# Patient Record
Sex: MALE | Race: WHITE | NOT HISPANIC OR LATINO | Employment: FULL TIME | URBAN - METROPOLITAN AREA
[De-identification: names, ages, dates, MRNs, and addresses within clinical notes are randomized per-mention and may not be internally consistent; named-entity substitution may affect disease eponyms.]

---

## 2023-01-20 ENCOUNTER — OFFICE VISIT (OUTPATIENT)
Dept: FAMILY MEDICINE CLINIC | Facility: CLINIC | Age: 38
End: 2023-01-20
Payer: COMMERCIAL

## 2023-01-20 VITALS
BODY MASS INDEX: 41.07 KG/M2 | OXYGEN SATURATION: 98 % | WEIGHT: 271 LBS | HEART RATE: 79 BPM | SYSTOLIC BLOOD PRESSURE: 140 MMHG | TEMPERATURE: 97.3 F | DIASTOLIC BLOOD PRESSURE: 74 MMHG | HEIGHT: 68 IN | RESPIRATION RATE: 18 BRPM

## 2023-01-20 DIAGNOSIS — L74.519 PRIMARY FOCAL HYPERHIDROSIS: ICD-10-CM

## 2023-01-20 DIAGNOSIS — I10 BENIGN ESSENTIAL HYPERTENSION: Primary | ICD-10-CM

## 2023-01-20 DIAGNOSIS — R73.09 INCREASED GLUCOSE LEVEL: ICD-10-CM

## 2023-01-20 DIAGNOSIS — Z00.00 HEALTHCARE MAINTENANCE: ICD-10-CM

## 2023-01-20 DIAGNOSIS — F41.9 ANXIETY: ICD-10-CM

## 2023-01-20 DIAGNOSIS — K21.9 GASTROESOPHAGEAL REFLUX DISEASE, UNSPECIFIED WHETHER ESOPHAGITIS PRESENT: ICD-10-CM

## 2023-01-20 DIAGNOSIS — E78.5 HYPERLIPIDEMIA, UNSPECIFIED HYPERLIPIDEMIA TYPE: ICD-10-CM

## 2023-01-20 PROBLEM — H91.92 HEARING LOSS OF LEFT EAR: Status: ACTIVE | Noted: 2017-01-09

## 2023-01-20 PROBLEM — H91.92 HEARING LOSS OF LEFT EAR: Status: RESOLVED | Noted: 2017-01-09 | Resolved: 2023-01-20

## 2023-01-20 PROBLEM — R20.2 PARESTHESIA OF LEFT UPPER LIMB: Status: ACTIVE | Noted: 2018-10-11

## 2023-01-20 PROBLEM — G44.209 TENSION-TYPE HEADACHE: Status: ACTIVE | Noted: 2018-08-28

## 2023-01-20 PROCEDURE — 99385 PREV VISIT NEW AGE 18-39: CPT

## 2023-01-20 PROCEDURE — 99214 OFFICE O/P EST MOD 30 MIN: CPT

## 2023-01-20 RX ORDER — GLYCOPYRROLATE 2 MG/1
2 TABLET ORAL 2 TIMES DAILY
COMMUNITY
End: 2023-01-20 | Stop reason: SDUPTHER

## 2023-01-20 RX ORDER — ATORVASTATIN CALCIUM 40 MG/1
40 TABLET, FILM COATED ORAL DAILY
Qty: 90 TABLET | Refills: 0 | Status: SHIPPED | OUTPATIENT
Start: 2023-01-20 | End: 2023-03-08

## 2023-01-20 RX ORDER — ATORVASTATIN CALCIUM 40 MG/1
40 TABLET, FILM COATED ORAL DAILY
COMMUNITY
End: 2023-01-20 | Stop reason: SDUPTHER

## 2023-01-20 RX ORDER — AMLODIPINE BESYLATE 10 MG/1
10 TABLET ORAL DAILY
Qty: 90 TABLET | Refills: 0 | Status: SHIPPED | OUTPATIENT
Start: 2023-01-20 | End: 2023-03-08

## 2023-01-20 RX ORDER — LANSOPRAZOLE 30 MG/1
30 CAPSULE, DELAYED RELEASE ORAL DAILY
Qty: 90 CAPSULE | Refills: 1 | Status: SHIPPED | OUTPATIENT
Start: 2023-01-20

## 2023-01-20 RX ORDER — METOPROLOL SUCCINATE 50 MG/1
50 TABLET, EXTENDED RELEASE ORAL DAILY
Qty: 90 TABLET | Refills: 0 | Status: SHIPPED | OUTPATIENT
Start: 2023-01-20 | End: 2023-03-08

## 2023-01-20 RX ORDER — GLYCOPYRROLATE 2 MG/1
2 TABLET ORAL 2 TIMES DAILY
Qty: 90 TABLET | Refills: 1 | Status: SHIPPED | OUTPATIENT
Start: 2023-01-20 | End: 2023-03-08

## 2023-01-20 RX ORDER — AMLODIPINE BESYLATE 10 MG/1
10 TABLET ORAL DAILY
COMMUNITY
End: 2023-01-20 | Stop reason: SDUPTHER

## 2023-01-20 RX ORDER — BUSPIRONE HYDROCHLORIDE 5 MG/1
5 TABLET ORAL 3 TIMES DAILY PRN
Qty: 60 TABLET | Refills: 0 | Status: SHIPPED | OUTPATIENT
Start: 2023-01-20 | End: 2023-02-03

## 2023-01-20 RX ORDER — LANSOPRAZOLE 30 MG/1
30 CAPSULE, DELAYED RELEASE ORAL DAILY
COMMUNITY
Start: 1999-01-01 | End: 2023-01-20 | Stop reason: SDUPTHER

## 2023-01-20 RX ORDER — METOPROLOL SUCCINATE 50 MG/1
50 TABLET, EXTENDED RELEASE ORAL DAILY
COMMUNITY
End: 2023-01-20 | Stop reason: SDUPTHER

## 2023-01-21 LAB
ALBUMIN SERPL-MCNC: 5 G/DL (ref 3.5–5.2)
ALBUMIN/GLOB SERPL: 2.5 G/DL
ALP SERPL-CCNC: 90 U/L (ref 39–117)
ALT SERPL-CCNC: 30 U/L (ref 1–41)
AST SERPL-CCNC: 15 U/L (ref 1–40)
BASOPHILS # BLD AUTO: 0.04 10*3/MM3 (ref 0–0.2)
BASOPHILS NFR BLD AUTO: 0.7 % (ref 0–1.5)
BILIRUB SERPL-MCNC: 0.4 MG/DL (ref 0–1.2)
BUN SERPL-MCNC: 16 MG/DL (ref 6–20)
BUN/CREAT SERPL: 18.6 (ref 7–25)
CALCIUM SERPL-MCNC: 9.8 MG/DL (ref 8.6–10.5)
CHLORIDE SERPL-SCNC: 105 MMOL/L (ref 98–107)
CO2 SERPL-SCNC: 26.6 MMOL/L (ref 22–29)
CREAT SERPL-MCNC: 0.86 MG/DL (ref 0.76–1.27)
EGFRCR SERPLBLD CKD-EPI 2021: 113 ML/MIN/1.73
EOSINOPHIL # BLD AUTO: 0.09 10*3/MM3 (ref 0–0.4)
EOSINOPHIL NFR BLD AUTO: 1.5 % (ref 0.3–6.2)
ERYTHROCYTE [DISTWIDTH] IN BLOOD BY AUTOMATED COUNT: 13.2 % (ref 12.3–15.4)
GLOBULIN SER CALC-MCNC: 2 GM/DL
GLUCOSE SERPL-MCNC: 91 MG/DL (ref 65–99)
HBA1C MFR BLD: 5.4 % (ref 4.8–5.6)
HCT VFR BLD AUTO: 45.1 % (ref 37.5–51)
HCV AB S/CO SERPL IA: <0.1 S/CO RATIO (ref 0–0.9)
HGB BLD-MCNC: 15.1 G/DL (ref 13–17.7)
IMM GRANULOCYTES # BLD AUTO: 0.01 10*3/MM3 (ref 0–0.05)
IMM GRANULOCYTES NFR BLD AUTO: 0.2 % (ref 0–0.5)
LYMPHOCYTES # BLD AUTO: 1.35 10*3/MM3 (ref 0.7–3.1)
LYMPHOCYTES NFR BLD AUTO: 23.2 % (ref 19.6–45.3)
MCH RBC QN AUTO: 28.2 PG (ref 26.6–33)
MCHC RBC AUTO-ENTMCNC: 33.5 G/DL (ref 31.5–35.7)
MCV RBC AUTO: 84.1 FL (ref 79–97)
MONOCYTES # BLD AUTO: 0.36 10*3/MM3 (ref 0.1–0.9)
MONOCYTES NFR BLD AUTO: 6.2 % (ref 5–12)
NEUTROPHILS # BLD AUTO: 3.96 10*3/MM3 (ref 1.7–7)
NEUTROPHILS NFR BLD AUTO: 68.2 % (ref 42.7–76)
NRBC BLD AUTO-RTO: 0 /100 WBC (ref 0–0.2)
PLATELET # BLD AUTO: 239 10*3/MM3 (ref 140–450)
POTASSIUM SERPL-SCNC: 4.6 MMOL/L (ref 3.5–5.2)
PROT SERPL-MCNC: 7 G/DL (ref 6–8.5)
RBC # BLD AUTO: 5.36 10*6/MM3 (ref 4.14–5.8)
SODIUM SERPL-SCNC: 143 MMOL/L (ref 136–145)
TSH SERPL DL<=0.005 MIU/L-ACNC: 1.03 UIU/ML (ref 0.27–4.2)
WBC # BLD AUTO: 5.81 10*3/MM3 (ref 3.4–10.8)

## 2023-01-23 NOTE — PROGRESS NOTES
Please inform pt of lab results    Blood levels within normal limits  Kidney, liver and thyroid function stable  A1C normal- no diabetes    Follow up 2 weeks as discussed. Call if questions.   Would check lipids in 2-3 months.

## 2023-01-26 ENCOUNTER — DOCUMENTATION (OUTPATIENT)
Dept: FAMILY MEDICINE CLINIC | Facility: CLINIC | Age: 38
End: 2023-01-26
Payer: COMMERCIAL

## 2023-02-03 ENCOUNTER — OFFICE VISIT (OUTPATIENT)
Dept: FAMILY MEDICINE CLINIC | Facility: CLINIC | Age: 38
End: 2023-02-03
Payer: COMMERCIAL

## 2023-02-03 VITALS
HEART RATE: 60 BPM | HEIGHT: 68 IN | WEIGHT: 275 LBS | SYSTOLIC BLOOD PRESSURE: 132 MMHG | TEMPERATURE: 97.1 F | BODY MASS INDEX: 41.68 KG/M2 | RESPIRATION RATE: 18 BRPM | OXYGEN SATURATION: 98 % | DIASTOLIC BLOOD PRESSURE: 74 MMHG

## 2023-02-03 DIAGNOSIS — I10 BENIGN ESSENTIAL HYPERTENSION: Primary | ICD-10-CM

## 2023-02-03 DIAGNOSIS — F41.9 ANXIETY: ICD-10-CM

## 2023-02-03 PROCEDURE — 99213 OFFICE O/P EST LOW 20 MIN: CPT

## 2023-02-03 RX ORDER — ESCITALOPRAM OXALATE 10 MG/1
10 TABLET ORAL DAILY
Qty: 30 TABLET | Refills: 1 | Status: SHIPPED | OUTPATIENT
Start: 2023-02-03 | End: 2023-03-08

## 2023-02-03 RX ORDER — BUSPIRONE HYDROCHLORIDE 7.5 MG/1
7.5 TABLET ORAL 3 TIMES DAILY PRN
Qty: 90 TABLET | Refills: 0 | Status: SHIPPED | OUTPATIENT
Start: 2023-02-03 | End: 2023-03-08

## 2023-02-03 NOTE — PROGRESS NOTES
"Subjective   Colin Basilio is a 37 y.o. male. Who presents to follow up on HTN and anxiety    History of Present Illness     HTN- BP improving on amlodipine 10 mg and metoprolol 50 mg daily. No issues.   Brought readings. ranging in 120-130s. See scanned.   Still working on diet and trying to lose weight. Used to weigh > 300 lbs. Has done great    Anxiety- buspar helps some  But still gets anxiety episodes every day. Same symptoms mentioned at last OV. No issues with medication.   No thoughts of self harm.   The following portions of the patient's history were reviewed and updated as appropriate: allergies, current medications, past family history, past medical history, past social history and past surgical history.    Review of Systems   Constitutional: Negative for fatigue and unexpected weight loss.   Eyes: Negative for visual disturbance.   Respiratory: Negative.    Cardiovascular: Negative.    Neurological: Negative for dizziness and headache.   Psychiatric/Behavioral: Negative for self-injury, sleep disturbance, suicidal ideas, depressed mood and stress. The patient is nervous/anxious.        Objective    /74   Pulse 60   Temp 97.1 °F (36.2 °C) (Temporal)   Resp 18   Ht 172.7 cm (68\")   Wt 125 kg (275 lb)   SpO2 98%   BMI 41.81 kg/m²     Physical Exam  Constitutional:       Appearance: Normal appearance. He is not ill-appearing.   Cardiovascular:      Rate and Rhythm: Normal rate and regular rhythm.      Pulses: Normal pulses.      Heart sounds: Normal heart sounds, S1 normal and S2 normal. No murmur heard.  Pulmonary:      Effort: Pulmonary effort is normal. No respiratory distress.      Breath sounds: Normal breath sounds.   Neurological:      General: No focal deficit present.      Mental Status: He is alert.   Psychiatric:         Attention and Perception: Attention normal.         Mood and Affect: Mood normal.         Speech: Speech normal.         Behavior: Behavior normal.         " Thought Content: Thought content normal.         Cognition and Memory: Cognition normal.         Judgment: Judgment normal.       Assessment & Plan   Diagnoses and all orders for this visit:    1. Benign essential hypertension (Primary)                -  BP improved at goal. Cont same tx. Call if issues.     2. Anxiety  -     escitalopram (Lexapro) 10 MG tablet; Take 1 tablet by mouth Daily.  Dispense: 30 tablet; Refill: 1  -     busPIRone (BUSPAR) 7.5 MG tablet; Take 1 tablet by mouth 3 (Three) Times a Day As Needed (anxiety).  Dispense: 90 tablet; Refill: 0  -     Still persistent symptoms - think would benefit from SSRI for maintenance as daily symptoms. Use and common SE discussed. If issues stop and call office. May take 2-6 weeks to feel effect  Will increase buspar to see if better relief during anxiety episodes.   Denies thoughts of self harm- if these occur call someone immediately    Follow up - 2 months as need to recheck lipids- if no improvement in anxiety symptoms or concerns come back sooner.          - Pt agrees with plan of care and states no further concerns or questions today    This document is intended for medical expert use only. Reading of this document by patients and/or patient's family without participating medical staff guidance may result in misinterpretation and unintended morbidity.  Any interpretation of such data is the responsibility of the patient and/or family member responsible for the patient in concert with their primary or specialist providers, not to be left for sources of online searches such as [employer], [employer] or similar queries. Relying on these approaches to knowledge may result in misinterpretation, misguided goals of care and even death should patients or family members try recommendations outside of the realm of professional medical care in a supervised way.     Please allow 3-5 business days for recommendations based on new results     Go to the ER for any possible  lifethreatening symptoms such as chest pain or shortness of air.      I personally spent 20 minutes with this patient, preparing for the visit, reviewing tests, obtaining and/or reviewing a separately obtained history, performing a medically appropriate examination and/or evaluation , counseling and educating the patient/family/caregiver, ordering medications, tests, or procedures, documenting information in the medical record and independently interpreting results.

## 2023-03-08 DIAGNOSIS — F41.9 ANXIETY: ICD-10-CM

## 2023-03-08 DIAGNOSIS — I10 BENIGN ESSENTIAL HYPERTENSION: ICD-10-CM

## 2023-03-08 DIAGNOSIS — L74.519 PRIMARY FOCAL HYPERHIDROSIS: ICD-10-CM

## 2023-03-08 DIAGNOSIS — E78.5 HYPERLIPIDEMIA, UNSPECIFIED HYPERLIPIDEMIA TYPE: ICD-10-CM

## 2023-03-08 RX ORDER — AMLODIPINE BESYLATE 10 MG/1
TABLET ORAL
Qty: 90 TABLET | Refills: 0 | Status: SHIPPED | OUTPATIENT
Start: 2023-03-08 | End: 2023-03-10

## 2023-03-08 RX ORDER — BUSPIRONE HYDROCHLORIDE 7.5 MG/1
TABLET ORAL
Qty: 90 TABLET | Refills: 0 | Status: SHIPPED | OUTPATIENT
Start: 2023-03-08 | End: 2023-03-09

## 2023-03-08 RX ORDER — ESCITALOPRAM OXALATE 10 MG/1
TABLET ORAL
Qty: 30 TABLET | Refills: 0 | Status: SHIPPED | OUTPATIENT
Start: 2023-03-08 | End: 2023-03-09 | Stop reason: SDUPTHER

## 2023-03-08 RX ORDER — GLYCOPYRROLATE 2 MG/1
TABLET ORAL
Qty: 90 TABLET | Refills: 0 | Status: SHIPPED | OUTPATIENT
Start: 2023-03-08 | End: 2023-03-10

## 2023-03-08 RX ORDER — METOPROLOL SUCCINATE 50 MG/1
TABLET, EXTENDED RELEASE ORAL
Qty: 90 TABLET | Refills: 0 | Status: SHIPPED | OUTPATIENT
Start: 2023-03-08 | End: 2023-03-10

## 2023-03-08 RX ORDER — ATORVASTATIN CALCIUM 40 MG/1
TABLET, FILM COATED ORAL
Qty: 90 TABLET | Refills: 0 | Status: SHIPPED | OUTPATIENT
Start: 2023-03-08 | End: 2023-03-10

## 2023-03-08 RX ORDER — BUSPIRONE HYDROCHLORIDE 5 MG/1
TABLET ORAL
Qty: 60 TABLET | Refills: 0 | Status: SHIPPED | OUTPATIENT
Start: 2023-03-08 | End: 2023-03-09 | Stop reason: DRUGHIGH

## 2023-03-09 DIAGNOSIS — F41.9 ANXIETY: ICD-10-CM

## 2023-03-09 RX ORDER — ESCITALOPRAM OXALATE 10 MG/1
10 TABLET ORAL DAILY
Qty: 60 TABLET | Refills: 0 | Status: SHIPPED | OUTPATIENT
Start: 2023-03-09

## 2023-03-09 RX ORDER — ESCITALOPRAM OXALATE 10 MG/1
10 TABLET ORAL DAILY
Qty: 60 TABLET | Refills: 0 | Status: SHIPPED | OUTPATIENT
Start: 2023-03-09 | End: 2023-03-09 | Stop reason: SDUPTHER

## 2023-03-09 RX ORDER — BUSPIRONE HYDROCHLORIDE 7.5 MG/1
TABLET ORAL
Qty: 90 TABLET | Refills: 1 | Status: SHIPPED | OUTPATIENT
Start: 2023-03-09

## 2023-03-10 DIAGNOSIS — L74.519 PRIMARY FOCAL HYPERHIDROSIS: ICD-10-CM

## 2023-03-10 DIAGNOSIS — E78.5 HYPERLIPIDEMIA, UNSPECIFIED HYPERLIPIDEMIA TYPE: ICD-10-CM

## 2023-03-10 DIAGNOSIS — I10 BENIGN ESSENTIAL HYPERTENSION: ICD-10-CM

## 2023-03-10 RX ORDER — ATORVASTATIN CALCIUM 40 MG/1
TABLET, FILM COATED ORAL
Qty: 90 TABLET | Refills: 0 | Status: SHIPPED | OUTPATIENT
Start: 2023-03-10

## 2023-03-10 RX ORDER — GLYCOPYRROLATE 2 MG/1
TABLET ORAL
Qty: 90 TABLET | Refills: 0 | Status: SHIPPED | OUTPATIENT
Start: 2023-03-10

## 2023-03-10 RX ORDER — METOPROLOL SUCCINATE 50 MG/1
TABLET, EXTENDED RELEASE ORAL
Qty: 90 TABLET | Refills: 0 | Status: SHIPPED | OUTPATIENT
Start: 2023-03-10

## 2023-03-10 RX ORDER — AMLODIPINE BESYLATE 10 MG/1
TABLET ORAL
Qty: 90 TABLET | Refills: 0 | Status: SHIPPED | OUTPATIENT
Start: 2023-03-10

## 2023-07-27 ENCOUNTER — OFFICE VISIT (OUTPATIENT)
Dept: FAMILY MEDICINE CLINIC | Facility: CLINIC | Age: 38
End: 2023-07-27
Payer: COMMERCIAL

## 2023-07-27 VITALS
RESPIRATION RATE: 18 BRPM | BODY MASS INDEX: 42.74 KG/M2 | DIASTOLIC BLOOD PRESSURE: 62 MMHG | HEIGHT: 68 IN | HEART RATE: 62 BPM | WEIGHT: 282 LBS | SYSTOLIC BLOOD PRESSURE: 120 MMHG | OXYGEN SATURATION: 96 %

## 2023-07-27 DIAGNOSIS — L74.519 PRIMARY FOCAL HYPERHIDROSIS: ICD-10-CM

## 2023-07-27 DIAGNOSIS — Z23 NEED FOR TETANUS BOOSTER: ICD-10-CM

## 2023-07-27 DIAGNOSIS — I10 BENIGN ESSENTIAL HYPERTENSION: ICD-10-CM

## 2023-07-27 DIAGNOSIS — E78.5 HYPERLIPIDEMIA, UNSPECIFIED HYPERLIPIDEMIA TYPE: ICD-10-CM

## 2023-07-27 DIAGNOSIS — Z13.1 DIABETES MELLITUS SCREENING: ICD-10-CM

## 2023-07-27 DIAGNOSIS — F41.9 ANXIETY: ICD-10-CM

## 2023-07-27 DIAGNOSIS — Z13.220 LIPID SCREENING: ICD-10-CM

## 2023-07-27 DIAGNOSIS — Z00.00 ANNUAL PHYSICAL EXAM: ICD-10-CM

## 2023-07-27 DIAGNOSIS — K21.9 GASTROESOPHAGEAL REFLUX DISEASE, UNSPECIFIED WHETHER ESOPHAGITIS PRESENT: ICD-10-CM

## 2023-07-27 DIAGNOSIS — M54.2 NECK PAIN: Primary | ICD-10-CM

## 2023-07-27 RX ORDER — AMLODIPINE BESYLATE 10 MG/1
10 TABLET ORAL DAILY
Qty: 90 TABLET | Refills: 0 | Status: SHIPPED | OUTPATIENT
Start: 2023-07-27

## 2023-07-27 RX ORDER — LANSOPRAZOLE 30 MG/1
30 CAPSULE, DELAYED RELEASE ORAL DAILY
Qty: 90 CAPSULE | Refills: 1 | Status: SHIPPED | OUTPATIENT
Start: 2023-07-27

## 2023-07-27 RX ORDER — GLYCOPYRROLATE 2 MG/1
2 TABLET ORAL 2 TIMES DAILY
Qty: 90 TABLET | Refills: 0 | Status: SHIPPED | OUTPATIENT
Start: 2023-07-27

## 2023-07-27 RX ORDER — ATORVASTATIN CALCIUM 40 MG/1
40 TABLET, FILM COATED ORAL DAILY
Qty: 90 TABLET | Refills: 0 | Status: SHIPPED | OUTPATIENT
Start: 2023-07-27

## 2023-07-27 RX ORDER — BUSPIRONE HYDROCHLORIDE 7.5 MG/1
7.5 TABLET ORAL 3 TIMES DAILY
Qty: 90 TABLET | Refills: 1 | Status: SHIPPED | OUTPATIENT
Start: 2023-07-27

## 2023-07-27 RX ORDER — ESCITALOPRAM OXALATE 10 MG/1
10 TABLET ORAL DAILY
Qty: 60 TABLET | Refills: 0 | Status: SHIPPED | OUTPATIENT
Start: 2023-07-27

## 2023-07-27 RX ORDER — METOPROLOL SUCCINATE 50 MG/1
50 TABLET, EXTENDED RELEASE ORAL DAILY
Qty: 90 TABLET | Refills: 0 | Status: SHIPPED | OUTPATIENT
Start: 2023-07-27

## 2023-07-27 NOTE — PROGRESS NOTES
"Subjective   Colin Basilio is a 38 y.o. male. Presents today for neck pain that has been ongoing for awhile.  He has noted increased numbness and tingling running down his arms.  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        History Of Present Illness      Patient Active Problem List   Diagnosis    Benign essential hypertension    Gastroesophageal reflux disease    Hypertensive disorder    Increased glucose level    Paresthesia of left upper limb    Primary focal hyperhidrosis    Tension-type headache       Social History     Socioeconomic History    Marital status:    Tobacco Use    Smoking status: Never    Smokeless tobacco: Never   Vaping Use    Vaping Use: Never used   Substance and Sexual Activity    Alcohol use: Yes     Comment: rare    Drug use: Never    Sexual activity: Yes     Partners: Female       No Known Allergies    Current Outpatient Medications on File Prior to Visit   Medication Sig Dispense Refill    [DISCONTINUED] amLODIPine (NORVASC) 10 MG tablet Take 1 tablet by mouth once daily 90 tablet 0    [DISCONTINUED] atorvastatin (LIPITOR) 40 MG tablet Take 1 tablet by mouth once daily 90 tablet 0    [DISCONTINUED] busPIRone (BUSPAR) 7.5 MG tablet TAKE 1 TABLET BY MOUTH THREE TIMES DAILY AS NEEDED FOR ANXIETY 90 tablet 1    [DISCONTINUED] escitalopram (LEXAPRO) 10 MG tablet Take 1 tablet by mouth Daily. 60 tablet 0    [DISCONTINUED] glycopyrrolate (ROBINUL) 2 MG tablet Take 1 tablet by mouth twice daily 90 tablet 0    [DISCONTINUED] lansoprazole (PREVACID) 30 MG capsule Take 1 capsule by mouth Daily. 90 capsule 1    [DISCONTINUED] metoprolol succinate XL (TOPROL-XL) 50 MG 24 hr tablet Take 1 tablet by mouth once daily 90 tablet 0     No current facility-administered medications on file prior to visit.       Objective     Vitals:    07/27/23 0822   BP: 120/62   Pulse: 62   Resp: 18   SpO2: 96%   Weight: 128 kg (282 lb)   Height: 172.7 cm (68\")   PainSc:   7 "   PainLoc: Neck       Body mass index is 42.88 kg/m².    Physical Exam    Procedures     Assessment & Plan   Diagnoses and all orders for this visit:    1. Neck pain (Primary)  -     XR Spine Cervical 2 or 3 View; Future  -     XR Spine Cervical 2 or 3 View  -     Ambulatory Referral to Pain Management    2. Annual physical exam  -     CBC & Differential  -     Comprehensive Metabolic Panel    3. Diabetes mellitus screening  -     Hemoglobin A1c    4. Lipid screening  -     Lipid Panel    5. Benign essential hypertension  Overview:  Formatting of this note might be different from the original.  Transitioned From:  Elevated blood pressure reading    Orders:  -     amLODIPine (NORVASC) 10 MG tablet; Take 1 tablet by mouth Daily.  Dispense: 90 tablet; Refill: 0  -     metoprolol succinate XL (TOPROL-XL) 50 MG 24 hr tablet; Take 1 tablet by mouth Daily.  Dispense: 90 tablet; Refill: 0    6. Hyperlipidemia, unspecified hyperlipidemia type  -     atorvastatin (LIPITOR) 40 MG tablet; Take 1 tablet by mouth Daily.  Dispense: 90 tablet; Refill: 0    7. Anxiety  -     busPIRone (BUSPAR) 7.5 MG tablet; Take 1 tablet by mouth 3 (Three) Times a Day. for anxiety  Dispense: 90 tablet; Refill: 1  -     escitalopram (LEXAPRO) 10 MG tablet; Take 1 tablet by mouth Daily.  Dispense: 60 tablet; Refill: 0    8. Gastroesophageal reflux disease, unspecified whether esophagitis present  Overview:  Formatting of this note might be different from the original.  Description: (PL)    Orders:  -     lansoprazole (PREVACID) 30 MG capsule; Take 1 capsule by mouth Daily.  Dispense: 90 capsule; Refill: 1    9. Primary focal hyperhidrosis  -     glycopyrrolate (ROBINUL) 2 MG tablet; Take 1 tablet by mouth 2 (Two) Times a Day.  Dispense: 90 tablet; Refill: 0    10. Need for tetanus booster  -     Tdap Vaccine => 8yo IM (BOOSTRIX)                Class 3 Severe Obesity (BMI >=40). Obesity-related health conditions include the following: hypertension,  dyslipidemias, and GERD. Obesity is unchanged. BMI is  elevated and Colin is working on diet and exercise. . We discussed low calorie, low carb based diet program, portion control, and increasing exercise.     Return in about 6 months (around 1/27/2024).     Discussed Care Gaps, ordered referrals and encouraged vaccination updates.       - Pt agrees with plan of care and denies further questions/concerns today  - This document is intended for medical expert use only. Persons  reading this document without medical staff guidance may result in misinterpretation and unintended morbidity     Go to the ER for any possible life-threatening symptoms such as chest pain or shortness of air.      Please allow 3-5 business days for recommendations based on new results      I personally spent 15 minutes with this patient, preparing for the visit, reviewing tests, obtaining and/or reviewing a separately obtained history, performing a medically appropriate examination and/or evaluation, counseling and educating the patient/family/caregiver, ordering medications,  documenting information in the medical record and indepentently interpreting results.

## 2023-07-28 ENCOUNTER — PATIENT ROUNDING (BHMG ONLY) (OUTPATIENT)
Dept: FAMILY MEDICINE CLINIC | Facility: CLINIC | Age: 38
End: 2023-07-28
Payer: COMMERCIAL

## 2023-07-28 LAB
ALBUMIN SERPL-MCNC: 4.9 G/DL (ref 3.5–5.2)
ALBUMIN/GLOB SERPL: 2.5 G/DL
ALP SERPL-CCNC: 99 U/L (ref 39–117)
ALT SERPL-CCNC: 36 U/L (ref 1–41)
AST SERPL-CCNC: 23 U/L (ref 1–40)
BASOPHILS # BLD AUTO: 0.03 10*3/MM3 (ref 0–0.2)
BASOPHILS NFR BLD AUTO: 0.5 % (ref 0–1.5)
BILIRUB SERPL-MCNC: 0.4 MG/DL (ref 0–1.2)
BUN SERPL-MCNC: 13 MG/DL (ref 6–20)
BUN/CREAT SERPL: 13.5 (ref 7–25)
CALCIUM SERPL-MCNC: 9.5 MG/DL (ref 8.6–10.5)
CHLORIDE SERPL-SCNC: 104 MMOL/L (ref 98–107)
CHOLEST SERPL-MCNC: 136 MG/DL (ref 0–200)
CO2 SERPL-SCNC: 28.8 MMOL/L (ref 22–29)
CREAT SERPL-MCNC: 0.96 MG/DL (ref 0.76–1.27)
EGFRCR SERPLBLD CKD-EPI 2021: 102.5 ML/MIN/1.73
EOSINOPHIL # BLD AUTO: 0.12 10*3/MM3 (ref 0–0.4)
EOSINOPHIL NFR BLD AUTO: 1.9 % (ref 0.3–6.2)
ERYTHROCYTE [DISTWIDTH] IN BLOOD BY AUTOMATED COUNT: 12.9 % (ref 12.3–15.4)
GLOBULIN SER CALC-MCNC: 2 GM/DL
GLUCOSE SERPL-MCNC: 108 MG/DL (ref 65–99)
HBA1C MFR BLD: 5.6 % (ref 4.8–5.6)
HCT VFR BLD AUTO: 44.5 % (ref 37.5–51)
HDLC SERPL-MCNC: 47 MG/DL (ref 40–60)
HGB BLD-MCNC: 15 G/DL (ref 13–17.7)
IMM GRANULOCYTES # BLD AUTO: 0.02 10*3/MM3 (ref 0–0.05)
IMM GRANULOCYTES NFR BLD AUTO: 0.3 % (ref 0–0.5)
LDLC SERPL CALC-MCNC: 67 MG/DL (ref 0–100)
LYMPHOCYTES # BLD AUTO: 1.5 10*3/MM3 (ref 0.7–3.1)
LYMPHOCYTES NFR BLD AUTO: 23.5 % (ref 19.6–45.3)
MCH RBC QN AUTO: 27.7 PG (ref 26.6–33)
MCHC RBC AUTO-ENTMCNC: 33.7 G/DL (ref 31.5–35.7)
MCV RBC AUTO: 82.1 FL (ref 79–97)
MONOCYTES # BLD AUTO: 0.45 10*3/MM3 (ref 0.1–0.9)
MONOCYTES NFR BLD AUTO: 7.1 % (ref 5–12)
NEUTROPHILS # BLD AUTO: 4.25 10*3/MM3 (ref 1.7–7)
NEUTROPHILS NFR BLD AUTO: 66.7 % (ref 42.7–76)
NRBC BLD AUTO-RTO: 0 /100 WBC (ref 0–0.2)
PLATELET # BLD AUTO: 245 10*3/MM3 (ref 140–450)
POTASSIUM SERPL-SCNC: 5.1 MMOL/L (ref 3.5–5.2)
PROT SERPL-MCNC: 6.9 G/DL (ref 6–8.5)
RBC # BLD AUTO: 5.42 10*6/MM3 (ref 4.14–5.8)
SODIUM SERPL-SCNC: 142 MMOL/L (ref 136–145)
TRIGL SERPL-MCNC: 122 MG/DL (ref 0–150)
VLDLC SERPL CALC-MCNC: 22 MG/DL (ref 5–40)
WBC # BLD AUTO: 6.37 10*3/MM3 (ref 3.4–10.8)

## 2023-07-28 NOTE — PROGRESS NOTES
A [employer] message has been sent to the patient for PATIENT ROUNDING with [facility].

## 2023-07-31 ENCOUNTER — TELEPHONE (OUTPATIENT)
Dept: FAMILY MEDICINE CLINIC | Facility: CLINIC | Age: 38
End: 2023-07-31
Payer: COMMERCIAL

## 2023-07-31 DIAGNOSIS — M54.12 CERVICAL RADICULOPATHY: Primary | ICD-10-CM

## 2023-08-31 ENCOUNTER — HOSPITAL ENCOUNTER (OUTPATIENT)
Dept: MRI IMAGING | Facility: HOSPITAL | Age: 38
Discharge: HOME | End: 2023-08-31
Admitting: NURSE PRACTITIONER
Payer: COMMERCIAL

## 2023-08-31 DIAGNOSIS — M54.12 CERVICAL RADICULOPATHY: ICD-10-CM

## 2023-08-31 PROCEDURE — 72141 MRI NECK SPINE W/O DYE: CPT

## 2023-09-02 NOTE — PROGRESS NOTES
Colin,    Your MRI shows that C2-C4   C4-C5 you have some degenerative disease on the left  C5-C6 you have some degenerative disease on the right - due to these two areas of degeneration, you have narrowing in the foraminal space where the spinal cord passes through - this can cause pain.  C6-C7 you have disc-osteophyte complex - more prominent on the left.  It touches the spinal cord and minimally flattens it left of midline.  Again, there is foraminal stenosis present due to degenerative disease.    The good news is, there is no cord signal disruption present.  This could explain some of the numbness and tingling in your arms though.

## 2023-09-08 ENCOUNTER — TELEPHONE (OUTPATIENT)
Dept: PAIN MEDICINE | Facility: CLINIC | Age: 38
End: 2023-09-08
Payer: COMMERCIAL

## 2023-09-11 ENCOUNTER — OFFICE VISIT (OUTPATIENT)
Dept: PAIN MEDICINE | Facility: CLINIC | Age: 38
End: 2023-09-11
Payer: COMMERCIAL

## 2023-09-11 VITALS
OXYGEN SATURATION: 98 % | HEART RATE: 54 BPM | TEMPERATURE: 97.8 F | DIASTOLIC BLOOD PRESSURE: 76 MMHG | BODY MASS INDEX: 43.65 KG/M2 | SYSTOLIC BLOOD PRESSURE: 110 MMHG | HEIGHT: 68 IN | WEIGHT: 288 LBS

## 2023-09-11 DIAGNOSIS — M54.12 CERVICAL RADICULAR PAIN: Primary | ICD-10-CM

## 2023-09-11 DIAGNOSIS — M54.2 NECK PAIN: ICD-10-CM

## 2023-09-11 PROCEDURE — 99214 OFFICE O/P EST MOD 30 MIN: CPT | Performed by: NURSE PRACTITIONER

## 2023-09-11 RX ORDER — CELECOXIB 100 MG/1
100 CAPSULE ORAL 2 TIMES DAILY PRN
Qty: 60 CAPSULE | Refills: 0 | Status: SHIPPED | OUTPATIENT
Start: 2023-09-11

## 2023-09-11 NOTE — PROGRESS NOTES
CHIEF COMPLAINT  Patient was referred by DEVONTE Hein for neck pain that he has had for a few years. Patient describes the pain as aching and sore that radiates into his left arm. He states that he has numbness and burning in his left arm. He also c/o headaches in the back of his head with the left side being worse.  He has not had PT. He is not taking anything for pain. He has tried salonpas which did not improve the pain. He has tried heat therapy. Working at his computer makes the pain worse.     Subjective   Colin Basilio is a 38 y.o. male.   He presents to the office for evaluation of neck pain. He was referred here by DEVONTE Hein.    Mr. Basilio's pain started a few years ago without inciting event.     Today his pain is 6/10VAS in severity. He describes his neck pain as burning and stabbing pain that radiates into the posterior aspect of his left arm radiating all the way into his left hand. He states that he feels his left hand is weaker compared to his right (he is left handed).  His pain is worsened by working at the computer, ROM, position with sleep; it is improved by nothing in particular. Heat is somewhat helpful but does not eliminate his pain. Ice is not helpful. He has also utilized salon pas without benefit. OTC analgesics (ibuprofen and tylenol) have not been helpful.     He also reports burning in his feet that started ~ 1 month ago. Reviewed that it is unlike this is related to his neck and I recommend he follow up with his PCP regarding this.     He works as a .     Past pain medications: OTC Tylenol, Ibuprofen, Salon Pas     Current pain medications: None     Past therapies:  Physical Therapy: None  Chiropractor: Yes, Once, many years ago  Massage Therapy: None  TENS: None  Neck or back surgery: None  Past pain management: None     Previous Injections:   None    Neck Pain   This is a chronic problem. The current episode started more than 1 year ago. The  problem occurs constantly. The problem has been gradually worsening. The pain is present in the left side. The quality of the pain is described as burning and stabbing. The pain is at a severity of 6/10. Exacerbated by: working on a computer, position with sleep, ROM. The pain is Same all the time. Associated symptoms include headaches, numbness and weakness (left arm). Pertinent negatives include no chest pain or fever. He has tried heat and ice for the symptoms.      PEG Assessment   What number best describes your pain on average in the past week?5  What number best describes how, during the past week, pain has interfered with your enjoyment of life?0  What number best describes how, during the past week, pain has interfered with your general activity?  0      Current Outpatient Medications:     amLODIPine (NORVASC) 10 MG tablet, Take 1 tablet by mouth Daily., Disp: 90 tablet, Rfl: 0    atorvastatin (LIPITOR) 40 MG tablet, Take 1 tablet by mouth Daily., Disp: 90 tablet, Rfl: 0    busPIRone (BUSPAR) 7.5 MG tablet, Take 1 tablet by mouth 3 (Three) Times a Day. for anxiety, Disp: 90 tablet, Rfl: 1    escitalopram (LEXAPRO) 10 MG tablet, Take 1 tablet by mouth Daily., Disp: 60 tablet, Rfl: 0    glycopyrrolate (ROBINUL) 2 MG tablet, Take 1 tablet by mouth 2 (Two) Times a Day., Disp: 90 tablet, Rfl: 0    lansoprazole (PREVACID) 30 MG capsule, Take 1 capsule by mouth Daily., Disp: 90 capsule, Rfl: 1    metoprolol succinate XL (TOPROL-XL) 50 MG 24 hr tablet, Take 1 tablet by mouth Daily., Disp: 90 tablet, Rfl: 0    The following portions of the patient's history were reviewed and updated as appropriate: allergies, current medications, past family history, past medical history, past social history, past surgical history, and problem list.    REVIEW OF PERTINENT MEDICAL DATA    Narrative & Impression   MRI EXAMINATION OF THE CERVICAL SPINE WITHOUT CONTRAST     HISTORY: Neck pain, left radiculopathy.     COMPARISON: None.      FINDINGS: The alignment of the cervical spine is within normal limits.  Signal intensity within the cord is normal on the axial T2 sequence.     C2-C3: There is no evidence of disc bulge or herniation.     C3-C4: There is no evidence of disc bulge or herniation.     C4-C5: Mild uncovertebral degenerative disease is present on the left.     C5-C6: Mild uncovertebral degenerative disease is present on the right  contributing to mild foraminal stenosis.     C6-C7: A mild central disc-osteophyte complex is present which is  slightly more prominent to the left. It abuts and minimally flattens the  ventral surface of the cord to the left of midline. Mild-to-moderate  foraminal stenosis is present on the left secondary to uncovertebral  degenerative disease.     C7-T1: There is no evidence of disc bulge or herniation.     IMPRESSION:  1. There is no evidence of a focal herniation or of cord signal  abnormality/cord compression.   2. Multilevel degenerative disease involving cervical spine is noted as  described above including a small left paracentral disc bulge or  protrusion at C6-C7 which abuts and mildly flattens the anterolateral  aspect of the cord to the left. At C6-C7, there is mild-to-moderate  foraminal stenosis on the left secondary to uncovertebral degenerative  disease. See above.     This report was finalized on 9/1/2023 5:07 PM by Dr. Naeem Romero M.D.     7/27/2023:  Creatinine-0.96  Platelets-245    Review of Systems   Constitutional:  Negative for activity change, chills, fatigue and fever.   HENT:  Negative for congestion.    Eyes:  Negative for visual disturbance.   Respiratory:  Negative for chest tightness and shortness of breath.    Cardiovascular:  Negative for chest pain.   Gastrointestinal:  Negative for abdominal pain, constipation and diarrhea.   Genitourinary:  Negative for difficulty urinating and dysuria.   Musculoskeletal:  Positive for neck pain and neck stiffness.   Neurological:   "Positive for weakness (left arm), light-headedness, numbness and headaches. Negative for dizziness.   Psychiatric/Behavioral:  Negative for agitation, self-injury, sleep disturbance and suicidal ideas. The patient is not nervous/anxious.      --  The aforementioned information the Chief Complaint section and above subjective data including any HPI data, and also the Review of Systems data, has been personally reviewed and affirmed.  --    Vitals:    09/11/23 1103   BP: 110/76   Pulse: 54   Temp: 97.8 °F (36.6 °C)   SpO2: 98%   Weight: 131 kg (288 lb)   Height: 172.7 cm (68\")   PainSc:   3   PainLoc: Neck     Objective   Physical Exam  Vitals and nursing note reviewed.   Constitutional:       Appearance: Normal appearance. He is well-developed.   Eyes:      General: Lids are normal.   Cardiovascular:      Rate and Rhythm: Normal rate.   Pulmonary:      Effort: Pulmonary effort is normal.   Musculoskeletal:      Cervical back: Tenderness present. No bony tenderness. No pain with movement. Decreased range of motion.      Comments:   -Rj Spurling   Neurological:      Mental Status: He is alert and oriented to person, place, and time.      Motor: No weakness.   Psychiatric:         Attention and Perception: Attention normal.         Mood and Affect: Mood normal.         Speech: Speech normal.         Behavior: Behavior normal.         Judgment: Judgment normal.       Assessment & Plan   Diagnoses and all orders for this visit:    1. Cervical radicular pain (Primary)  -     Ambulatory Referral to Physical Therapy Evaluate and treat  -     celecoxib (CeleBREX) 100 MG capsule; Take 1 capsule by mouth 2 (Two) Times a Day As Needed for Mild Pain.  Dispense: 60 capsule; Refill: 0    2. Neck pain  -     Ambulatory Referral to Physical Therapy Evaluate and treat  -     celecoxib (CeleBREX) 100 MG capsule; Take 1 capsule by mouth 2 (Two) Times a Day As Needed for Mild Pain.  Dispense: 60 capsule; Refill: 0      --- Colin Cage " Praful reports a pain score of 3.  Given his pain assessment as noted, treatment options were discussed and the following options were decided upon as a follow-up plan to address the patient's pain: referral to Physical Therapy.  --- Will refer to Physical Therapy and start Celebrex 100 mg BID PRN  --- If no relief with PT and NSAID then plan for SALMA  --- Follow up with PCP with regards to burning pain in feet as this is unlikely to be related to his neck pain.   --- Follow-up 6 weeks or sooner if needed.      ARCADIO REPORT  As the clinician, I personally reviewed the ARCADIO from 9/11/2023 while the patient was in the office today.    Dictated utilizing Dragon dictation.

## 2023-09-20 ENCOUNTER — TREATMENT (OUTPATIENT)
Dept: PHYSICAL THERAPY | Facility: CLINIC | Age: 38
End: 2023-09-20
Payer: COMMERCIAL

## 2023-09-20 DIAGNOSIS — M54.2 PAIN, NECK: ICD-10-CM

## 2023-09-20 DIAGNOSIS — M54.12 CERVICAL RADICULAR PAIN: Primary | ICD-10-CM

## 2023-09-20 PROCEDURE — 97530 THERAPEUTIC ACTIVITIES: CPT | Performed by: PHYSICAL THERAPIST

## 2023-09-20 PROCEDURE — 97161 PT EVAL LOW COMPLEX 20 MIN: CPT | Performed by: PHYSICAL THERAPIST

## 2023-09-20 PROCEDURE — 97110 THERAPEUTIC EXERCISES: CPT | Performed by: PHYSICAL THERAPIST

## 2023-09-20 NOTE — PROGRESS NOTES
Physical Therapy Initial Evaluation and Plan of Care  [address] [facility], [address]  [place] [address]    Patient: Colin Basilio   : 1985  Diagnosis/ICD-10 Code:  Cervical radicular pain [M54.12]  Referring practitioner: DEVONTE Rodriguez  Date of Initial Visit: 2023  Today's Date: 2023  Patient seen for 1 session         Visit Diagnoses:    ICD-10-CM ICD-9-CM   1. Cervical radicular pain  M54.12 723.4   2. Pain, neck  M54.2 723.1         Subjective Questionnaire: NDI:10      Subjective Evaluation    History of Present Illness  Mechanism of injury: Patient reports that his neck has bothered him a couple years.  Denies an injury at this time.  States that the neck has gotten worse over the last couple months.  Reports a burning and numb sensation in the left UE extending to digits 4 and 5.  Had an MRI which showed a disc bulge.    Denies any previous surgeries to the neck or shoulder.      Patient Occupation: deskwork Pain  Current pain ratin  At worst pain ratin  Location: left UT extending down the left UE to the hand  Quality: burning (numbness in digits 4 and 5 on the left hand)  Relieving factors: rest  Exacerbated by: sleeping on left side, being at the desk.  Progression: worsening           Objective          Postural Observations    Additional Postural Observation Details  Min protracted shoulders and forward head.    Palpation     Additional Palpation Details  No TTP present.    Active Range of Motion   Cervical/Thoracic Spine   Cervical    Flexion: Neck active flexion: WNL.   Extension: Neck active extension: WNL.   Left lateral flexion: Neck active lateral bend left: WNL.   Right lateral flexion: Neck active lateral bend right: WNL.   Left rotation: WFL  Right rotation: WFL  Left Shoulder   Flexion: Left shoulder active forward flexion: WNL.   Abduction: Left shoulder active abduction: WNL.   External rotation 0°: Left shoulder active external rotation at 0  degrees: WNL.     Right Shoulder   Flexion: Right shoulder active forward flexion: WNL.   Abduction: Right shoulder active abduction: WNL.   External rotation 0°: Right shoulder active external rotation at 0 degrees: WNL.     Additional Active Range of Motion Details  Elbow AROM WNL    Strength/Myotome Testing     Left Shoulder     Planes of Motion   Flexion: 4   Abduction: 4+   External rotation at 0°: 4   Internal rotation at 0°: 4+     Right Shoulder     Planes of Motion   Flexion: 4   Abduction: 4+   External rotation at 0°: 4   Internal rotation at 0°: 4+     Left Elbow   Flexion: 4+  Extension: 4    Right Elbow   Flexion: 4+  Extension: 4    Tests     Additional Tests Details  - Spurlings bilaterally        Assessment & Plan       Assessment  Impairments: activity intolerance, impaired physical strength, lacks appropriate home exercise program and pain with function   Assessment details: Patient presents with c/o pain, headaches, decreased strength and poor static posture which is limiting his ability to perform certain activities.  Barriers to therapy: none  Prognosis: good  Prognosis details: STG's to be met by 2 weeks  1)  Independent with HEP  2)  Decrease pain by 50% or more  3)  Decrease headaches by 50% or more    LTG's to be met by 4 weeks  1)  Independent with HEP progression  2)  Decrease pain by 75% or more  3)  Increase strength for the UE to 4+/5  4)  Decrease parasthesias by 75% or more  5)  Patient to perform activities without limitations        Plan  Therapy options: will be seen for skilled therapy services  Planned therapy interventions: strengthening, stretching, therapeutic activities, home exercise program and neuromuscular re-education  Frequency: 1x week  Duration in weeks: 4  Treatment plan discussed with: patient          Timed:         Manual Therapy:    0     mins  07013;     Therapeutic Exercise:    10     mins  19085;     Neuromuscular Cuca:    0    mins  95897;    Therapeutic  Activity:     8     mins  43855;     Gait Trainin     mins  94723;     Ultrasound:     0     mins  58701;          Un-Timed:  Electrical Stimulation:    0     mins  08890 ( );    Low Eval     12     Mins  70259  Mod Eval     0     Mins  36603  High Eval                       0     Mins  19471        Timed Treatment:   18   mins   Total Treatment:     30   mins          PT: Armani Schumacher, PT     [place] License [id number]  Electronically signed by Armani Schumacher PT, 23, 7:13 AM EDT    Certification Period: 2023 thru 2023  I certify that the therapy services are furnished while this patient is under my care.  The services outlined above are required by this patient, and will be reviewed every 90 days.    Lynette Santos, Owen  [address] [facility]  [address] [place] [id number]   NPI: [id number]      Armani Schumacher PT   License number: [id number]        Physician Signature:__________________________________________________    PHYSICIAN: Lynette Santos APRN      DATE:     Please sign and return via fax to [email or web address] . Thank you, [facility].

## 2023-09-27 ENCOUNTER — TREATMENT (OUTPATIENT)
Dept: PHYSICAL THERAPY | Facility: CLINIC | Age: 38
End: 2023-09-27
Payer: COMMERCIAL

## 2023-09-27 DIAGNOSIS — M54.12 CERVICAL RADICULAR PAIN: Primary | ICD-10-CM

## 2023-09-27 DIAGNOSIS — M54.2 PAIN, NECK: ICD-10-CM

## 2023-09-27 PROCEDURE — 97110 THERAPEUTIC EXERCISES: CPT | Performed by: PHYSICAL THERAPIST

## 2023-09-27 PROCEDURE — 97530 THERAPEUTIC ACTIVITIES: CPT | Performed by: PHYSICAL THERAPIST

## 2023-09-27 NOTE — PROGRESS NOTES
Physical Therapy Daily Treatment Note  [address] [facility], [address]  [place] [address]      Patient: Colin Basilio   : 1985  Referring practitioner: DEVONTE Rodriguez  Date of Initial Visit: Type: THERAPY  Noted: 2023  Today's Date: 2023  Patient seen for 2 sessions       Visit Diagnoses:    ICD-10-CM ICD-9-CM   1. Cervical radicular pain  M54.12 723.4   2. Pain, neck  M54.2 723.1         Subjective   Patient reports that the neck feels better, currently denies pain.    Objective   See Exercise, Manual, and Modality Logs for complete treatment.       Assessment/Plan  Subjective reports are greatly improved since the last visit.  Added prone I's and pull aparts with the theraband, in addition to increasing the previous strengthening exercises.  Patient tolerated the increase in the routine very well, no reports of pain in the neck with the routine.  Plan to continue PT 1 more visit, then D/C to [facility].      Timed:         Manual Therapy:    0     mins  04256;     Therapeutic Exercise:    19     mins  34938;     Neuromuscular Cuca:    0    mins  81450;    Therapeutic Activity:     8     mins  91949;     Gait Training      0    mins  88773;  Work Conditioning     0   mins  84614       Untimed:  Electrical Stimulation:    0     mins  68029 ( );      Timed Treatment:   27   mins   Total Treatment:     27   mins    Armani Schumacher, PT  [place] License: [id number]

## 2023-10-02 ENCOUNTER — DOCUMENTATION (OUTPATIENT)
Dept: PHYSICAL THERAPY | Facility: CLINIC | Age: 38
End: 2023-10-02

## 2023-10-02 ENCOUNTER — TREATMENT (OUTPATIENT)
Dept: PHYSICAL THERAPY | Facility: CLINIC | Age: 38
End: 2023-10-02
Payer: COMMERCIAL

## 2023-10-02 DIAGNOSIS — M54.2 PAIN, NECK: ICD-10-CM

## 2023-10-02 DIAGNOSIS — M54.12 CERVICAL RADICULAR PAIN: Primary | ICD-10-CM

## 2023-10-02 PROCEDURE — 97530 THERAPEUTIC ACTIVITIES: CPT | Performed by: PHYSICAL THERAPIST

## 2023-10-02 PROCEDURE — 97110 THERAPEUTIC EXERCISES: CPT | Performed by: PHYSICAL THERAPIST

## 2023-10-02 NOTE — PROGRESS NOTES
Physical Therapy Daily Treatment Note  [address] [facility] [address], [address]  [place] [address]      Patient: Colin Basilio   : 1985  Referring practitioner: DEVONTE Rodriguez  Date of Initial Visit: Type: THERAPY  Noted: 2023  Today's Date: 10/2/2023  Patient seen for 3 sessions       Visit Diagnoses:    ICD-10-CM ICD-9-CM   1. Cervical radicular pain  M54.12 723.4   2. Pain, neck  M54.2 723.1           Subjective   Patient reports that the neck isn't as stiff as it was, also reports that the pain is getting better.    Objective   See Exercise, Manual, and Modality Logs for complete treatment.       Assessment/Plan  Subjective reports continue to be improved since beginning PT.  Feel that the patient can continue with the HEP at this time and he was agreeable to this.  Added diagonal pull aparts to the routine.  Patient tolerated the increase in the routine very well, no c/o pain with the exercises.      Timed:         Manual Therapy:    0     mins  53738;     Therapeutic Exercise:    23     mins  68785;    Neuromuscular Cuca:    0    mins  33028;    Therapeutic Activity:     8     mins  80221;     Gait Training      0    mins  94024;  Work Conditioning     0   mins  23780       Untimed:  Electrical Stimulation:    0     mins  81375 ( );      Timed Treatment:   31   mins   Total Treatment:     31   mins    Armani Schumacher, PT  [place] License: [id number]

## 2023-10-25 ENCOUNTER — OFFICE VISIT (OUTPATIENT)
Dept: PAIN MEDICINE | Facility: CLINIC | Age: 38
End: 2023-10-25
Payer: COMMERCIAL

## 2023-10-25 ENCOUNTER — HOSPITAL ENCOUNTER (OUTPATIENT)
Dept: GENERAL RADIOLOGY | Facility: HOSPITAL | Age: 38
Discharge: HOME | End: 2023-10-25
Admitting: NURSE PRACTITIONER
Payer: COMMERCIAL

## 2023-10-25 ENCOUNTER — PREP FOR SURGERY (OUTPATIENT)
Dept: SURGERY | Facility: SURGERY CENTER | Age: 38
End: 2023-10-25
Payer: COMMERCIAL

## 2023-10-25 VITALS
BODY MASS INDEX: 45.16 KG/M2 | HEIGHT: 68 IN | OXYGEN SATURATION: 96 % | TEMPERATURE: 98.3 F | RESPIRATION RATE: 20 BRPM | HEART RATE: 60 BPM | DIASTOLIC BLOOD PRESSURE: 60 MMHG | SYSTOLIC BLOOD PRESSURE: 110 MMHG | WEIGHT: 298 LBS

## 2023-10-25 DIAGNOSIS — M54.2 NECK PAIN: ICD-10-CM

## 2023-10-25 DIAGNOSIS — M54.42 CHRONIC BILATERAL LOW BACK PAIN WITH BILATERAL SCIATICA: Primary | ICD-10-CM

## 2023-10-25 DIAGNOSIS — M54.12 CERVICAL RADICULAR PAIN: ICD-10-CM

## 2023-10-25 DIAGNOSIS — M54.41 CHRONIC BILATERAL LOW BACK PAIN WITH BILATERAL SCIATICA: Primary | ICD-10-CM

## 2023-10-25 DIAGNOSIS — G89.29 CHRONIC BILATERAL LOW BACK PAIN WITH BILATERAL SCIATICA: Primary | ICD-10-CM

## 2023-10-25 DIAGNOSIS — M54.12 CERVICAL RADICULAR PAIN: Primary | ICD-10-CM

## 2023-10-25 PROCEDURE — 99214 OFFICE O/P EST MOD 30 MIN: CPT | Performed by: NURSE PRACTITIONER

## 2023-10-25 PROCEDURE — 72100 X-RAY EXAM L-S SPINE 2/3 VWS: CPT

## 2023-10-25 RX ORDER — SODIUM CHLORIDE 0.9 % (FLUSH) 0.9 %
10 SYRINGE (ML) INJECTION EVERY 12 HOURS SCHEDULED
OUTPATIENT
Start: 2023-10-25

## 2023-10-25 RX ORDER — SODIUM CHLORIDE 0.9 % (FLUSH) 0.9 %
10 SYRINGE (ML) INJECTION AS NEEDED
OUTPATIENT
Start: 2023-10-25

## 2023-10-25 NOTE — PROGRESS NOTES
CHIEF COMPLAINT  Neck pain  Pain is consistent.     Subjective   Colin Basilio is a 38 y.o. male  who presents for follow-up.  He has a history of neck pain. At his last office visit we started celebrex 100 mg BID and prescribed PT for his neck pain.  Today his pain is 7/10VAS in severity. He continues to complain of neck pain with pain radiating into his left upper extremity. He does report subjective weakness in his left upper extremity, no objective weakness on exam.     He also complains of low back pain today. He describes this as aching and pressure with burning pain radiating into his posterior lower extremities into his feet. This is not worsened by anything in particular. He has not completed PT for this and has no updated imaging.     Past pain medications: OTC Tylenol, Ibuprofen, Salon Pas     Current pain medications: None     Past therapies:  Physical Therapy: Yes, for neck pain, helped with stiffness but no improvement in neck pain and left arm symptoms.   Chiropractor: Yes, Once, many years ago  Massage Therapy: None  TENS: None  Neck or back surgery: None  Past pain management: None     Previous Injections:   None    Neck Pain   This is a chronic problem. The current episode started more than 1 year ago. The problem occurs constantly. The problem has been unchanged (remains unstable). The pain is present in the left side. The quality of the pain is described as burning and stabbing. The pain is at a severity of 7/10. Exacerbated by: working on a computer, position with sleep, ROM. The pain is Same all the time. Associated symptoms include headaches, numbness (left arm) and weakness (left arm). Pertinent negatives include no chest pain or fever. He has tried heat and ice for the symptoms.   Back Pain  This is a chronic problem. The current episode started more than 1 month ago. The problem occurs constantly. The problem has been gradually worsening since onset. The pain is present in the lumbar  spine. The quality of the pain is described as aching and burning. The pain radiates to the right thigh, left thigh, right foot and left foot. Exacerbated by: nothing in particular. Associated symptoms include headaches, numbness (left arm) and weakness (left arm). Pertinent negatives include no abdominal pain, chest pain, dysuria or fever. He has tried NSAIDs for the symptoms.      PEG Assessment   What number best describes your pain on average in the past week?7  What number best describes how, during the past week, pain has interfered with your enjoyment of life?7  What number best describes how, during the past week, pain has interfered with your general activity?  7    The following portions of the patient's history were reviewed and updated as appropriate: allergies, current medications, past family history, past medical history, past social history, past surgical history, and problem list.    Review of Systems   Constitutional:  Negative for activity change, fatigue and fever.   HENT:  Negative for congestion.    Respiratory:  Negative for cough and chest tightness.    Cardiovascular:  Negative for chest pain.   Gastrointestinal:  Negative for abdominal pain, constipation and diarrhea.   Genitourinary:  Negative for difficulty urinating and dysuria.   Musculoskeletal:  Positive for back pain and neck pain.   Neurological:  Positive for weakness (left arm), numbness (left arm) and headaches. Negative for dizziness and light-headedness.   Psychiatric/Behavioral:  Negative for agitation, sleep disturbance and suicidal ideas. The patient is not nervous/anxious.        --  The aforementioned information the Chief Complaint section and above subjective data including any HPI data, and also the Review of Systems data, has been personally reviewed and affirmed.  --    Vitals:    10/25/23 1133   BP: 110/60   BP Location: Left arm   Patient Position: Sitting   Cuff Size: Large Adult   Pulse: 60   Resp: 20   Temp: 98.3  "°F (36.8 °C)   TempSrc: Temporal   SpO2: 96%   Weight: 135 kg (298 lb)   Height: 172.7 cm (68\")   PainSc:   7     Objective   Physical Exam  Vitals and nursing note reviewed.   Constitutional:       Appearance: Normal appearance. He is well-developed.   Eyes:      General: Lids are normal.   Cardiovascular:      Rate and Rhythm: Normal rate.   Pulmonary:      Effort: Pulmonary effort is normal.   Musculoskeletal:      Cervical back: Tenderness present. Decreased range of motion.      Lumbar back: Tenderness present. Decreased range of motion. Positive left straight leg raise test. Negative right straight leg raise test.   Neurological:      Mental Status: He is alert and oriented to person, place, and time.      Motor: No weakness.   Psychiatric:         Attention and Perception: Attention normal.         Mood and Affect: Mood normal.         Speech: Speech normal.         Behavior: Behavior normal.         Judgment: Judgment normal.         Assessment & Plan   Diagnoses and all orders for this visit:    1. Chronic bilateral low back pain with bilateral sciatica (Primary)  -     XR Spine Lumbar 2 or 3 View  -     Ambulatory Referral to Physical Therapy Evaluate and treat    2. Cervical radicular pain    3. Neck pain      Colin Basilio reports a pain score of 7.  Given his pain assessment as noted, treatment options were discussed and the following options were decided upon as a follow-up plan to address the patient's pain:  injections, update imaging, and PT .    --- SALMA at C6-7  Reviewed the procedure at length with the patient.  Included in the review was expectations, complications, risk and benefits.The procedure was described in detail and the risks, benefits and alternatives were discussed with the patient (including but not limited to: bleeding, infection, nerve damage, worsening of pain, inability to perform injection, paralysis, seizures, coma, no pain relief and death) who agreed to proceed.  " Discussed the potential for sedation if warranted/wanted.  The procedure will plan to be performed at [facility] with fluoroscopic guidance(unless ultrasound is indicated) and could potentially have steroids and contrast dye used. Questions were answered and in a way the patient could understand.  Patient verbalized understanding and wishes to proceed.  This intervention will be ordered.  Discussed with patient that all procedures are part of a multimodal plan of care and include either formal PT or a home exercise program.  Patient has no evidence of coagulopathy or current infection.    --- Lumbar x-ray and PT ordered for low back pain. If no improvement will need lumbar MRI.   --- Follow-up after procedure     Dictated utilizing Dragon dictation.

## 2023-10-30 NOTE — PROGRESS NOTES
Lumbar spine xray shows mild disc narrowing at L3-4, otherwise normal. Recommend proceeding with PT for low back pain.

## 2023-11-20 ENCOUNTER — TRANSCRIBE ORDERS (OUTPATIENT)
Dept: SURGERY | Facility: SURGERY CENTER | Age: 38
End: 2023-11-20
Payer: COMMERCIAL

## 2023-11-20 DIAGNOSIS — Z41.9 SURGERY, ELECTIVE: Primary | ICD-10-CM

## 2023-11-20 PROBLEM — M54.12 CERVICAL RADICULAR PAIN: Status: ACTIVE | Noted: 2023-10-25

## 2023-12-12 ENCOUNTER — TELEPHONE (OUTPATIENT)
Dept: PAIN MEDICINE | Facility: CLINIC | Age: 38
End: 2023-12-12

## 2023-12-12 NOTE — TELEPHONE ENCOUNTER
Caller: Colin Basilio    Relationship to patient: Self    Best call back number: [phone] (home)     Patient is needing: PATIENT HAS A PROCEDURE ON 12/18/23 AND NEEDS TO KNOW WHEN AND WHERE HE NEEDS TO BE ON THAT DAY. HE ALSO WANTS TO KNOW IF THERE IS ANYTHING THAT HE NEEDS TO DO BEFOREHAND TO PREPARE FOR IT.   PATIENT STATED HE DID NOT RECEIVE ANY PRIOR INSTRUCTION.   PLEASE ADVISE.

## 2023-12-17 RX ORDER — MIDAZOLAM HYDROCHLORIDE 2 MG/2ML
2 INJECTION, SOLUTION INTRAMUSCULAR; INTRAVENOUS ONCE
Status: DISCONTINUED | OUTPATIENT
Start: 2023-12-18 | End: 2023-12-18 | Stop reason: HOSPADM

## 2023-12-17 RX ORDER — FENTANYL CITRATE 50 UG/ML
50 INJECTION, SOLUTION INTRAMUSCULAR; INTRAVENOUS ONCE
Status: DISCONTINUED | OUTPATIENT
Start: 2023-12-18 | End: 2023-12-18 | Stop reason: HOSPADM

## 2023-12-18 ENCOUNTER — HOSPITAL ENCOUNTER (OUTPATIENT)
Dept: GENERAL RADIOLOGY | Facility: SURGERY CENTER | Age: 38
Setting detail: HOSPITAL OUTPATIENT SURGERY
End: 2023-12-18
Payer: COMMERCIAL

## 2023-12-18 ENCOUNTER — HOSPITAL ENCOUNTER (OUTPATIENT)
Facility: SURGERY CENTER | Age: 38
Setting detail: HOSPITAL OUTPATIENT SURGERY
Discharge: HOME | End: 2023-12-18
Attending: ANESTHESIOLOGY | Admitting: ANESTHESIOLOGY
Payer: COMMERCIAL

## 2023-12-18 VITALS
RESPIRATION RATE: 16 BRPM | HEART RATE: 62 BPM | WEIGHT: 300 LBS | TEMPERATURE: 96 F | HEIGHT: 68 IN | OXYGEN SATURATION: 96 % | SYSTOLIC BLOOD PRESSURE: 123 MMHG | BODY MASS INDEX: 45.47 KG/M2 | DIASTOLIC BLOOD PRESSURE: 74 MMHG

## 2023-12-18 DIAGNOSIS — Z41.9 SURGERY, ELECTIVE: ICD-10-CM

## 2023-12-18 DIAGNOSIS — M54.12 CERVICAL RADICULAR PAIN: ICD-10-CM

## 2023-12-18 PROCEDURE — 25510000001 IOPAMIDOL 61 % SOLUTION 30 ML VIAL: Performed by: ANESTHESIOLOGY

## 2023-12-18 PROCEDURE — 62321 NJX INTERLAMINAR CRV/THRC: CPT | Performed by: ANESTHESIOLOGY

## 2023-12-18 PROCEDURE — 77002 NEEDLE LOCALIZATION BY XRAY: CPT

## 2023-12-18 PROCEDURE — 25010000002 METHYLPREDNISOLONE PER 80 MG: Performed by: ANESTHESIOLOGY

## 2023-12-18 PROCEDURE — 25010000002 BUPIVACAINE (PF) 0.5 % SOLUTION 10 ML VIAL: Performed by: ANESTHESIOLOGY

## 2023-12-18 PROCEDURE — 76000 FLUOROSCOPY <1 HR PHYS/QHP: CPT

## 2023-12-18 RX ORDER — SODIUM CHLORIDE 0.9 % (FLUSH) 0.9 %
10 SYRINGE (ML) INJECTION AS NEEDED
Status: DISCONTINUED | OUTPATIENT
Start: 2023-12-18 | End: 2023-12-18 | Stop reason: HOSPADM

## 2023-12-18 RX ORDER — SODIUM CHLORIDE 0.9 % (FLUSH) 0.9 %
10 SYRINGE (ML) INJECTION EVERY 12 HOURS SCHEDULED
Status: DISCONTINUED | OUTPATIENT
Start: 2023-12-18 | End: 2023-12-18 | Stop reason: HOSPADM

## 2023-12-18 NOTE — OP NOTE
Cervical Epidural Steroid Injection @ C6-C7  [facility]    PREOPERATIVE DIAGNOSIS: Left Cervical Radiculopathy  POSTOPERATIVE DIAGNOSIS:  Same as preop diagnosis    PROCEDURE:   Cervical Epidural Steroid Injection, Therapeutic Translaminar Injection, with epidurogram, at  C6-C7 level    PRE-PROCEDURE DISCUSSION WITH PATIENT:    Risks and complications were discussed with the patient prior to starting the procedure and informed consent was obtained.  We discussed various topics including but not limited to bleeding, infection, injury, paralysis, nerve injury, dural puncture, coma, death, worsening of clinical picture, lack of pain relief, and postprocedural soreness.    SURGEON:  Preston Toledo MD    REASON FOR PROCEDURE:    Degenerative changes are noted in the area., Stenotic area is noted, and is likely contributing to this chronic &/or recurrent pain. , and Radiating pattern of pain is likely consistent with degenerative changes in the area.    SEDATION:  Patient declined administration of moderate sedation   and , but an IV access was obtained for safety.  ANESTHETIC:  Marcaine 0.25%  STEROID:   Methylprednisolone (DEPO MEDROL) 80mg/ml    DESCRIPTON OF PROCEDURE:    After obtaining informed consent, I.V. was started in the preop area.   The patient was taken to the operating room and placed in the prone position.  EKG, blood pressure, and pulse oximeter were monitored throughout, by the RN under my guidance. All pressure points were well padded.  The cervicothoracic spine area was prepped with Chloraprep and draped in a sterile fashion.  Under fluoroscopic guidance, the aforementioned interlaminar space was identified. Skin and subcutaneous tissues were anesthetized with 1% lidocaine in the middle of the space. A Tuohy needle was introduced through the skin and advanced to this interlaminar space and into the epidural space under fluoroscopic guidance and verified with loss-of-resistance  technique to air.  After confirming the position of the needle with the fluoroscope with all the views, and after aspiration was confirmed negative for blood and CSF, 1.5 mL of Omnipaque was injected.  After seeing appropriate epidurogram with lateral and PA views, a total of 3 cc solution was injected, consisting of 1cc of local anesthetic as above, with normal saline and injectable steroid as above.     ESTIMATED BLOOD LOSS:  <5 mL  SPECIMENS:  None    COMPLICATIONS:     No complications were noted., There was no indication of vascular uptake on live injection of contrast dye., There was no indication of intrathecal uptake on live injection of contrast dye., There was not any evidence of dural puncture.  , and The patient did not have any signs of postprocedure numbness nor weakness.    TOLERANCE & DISCHARGE CONDITION:    The patient tolerated the procedure well.  The patient was transported to the recovery area without difficulties.  The patient was discharged to home under the care of family in stable and satisfactory condition.    PLAN OF CARE:  The patient was given our standard instruction sheet.  The patient will Return to clinic 4-6 wks.  The patient will resume all medications as per the medication reconciliation sheet.

## 2023-12-18 NOTE — DISCHARGE INSTRUCTIONS
[facility] - Post-procedure Instructions          --  While there are no absolute restrictions, it is recommended that you do not perform strenuous activity today. In the morning, you may resume your level of activity as before your block.    --  If you have a band-aid at your injection site, please remove it later today. Observe the area for any redness, swelling, pus-like drainage, or a temperature over 101°. If any of these symptoms occur, please call your doctor at [phone]. If after office hours, leave a message and the on-call provider will return your call.    --  Ice may be applied to your injection site. It is recommended you avoid direct heat (heating pad; hot tub) for 1-2 days.    --  Call [facility] at [phone] if you experience persistent headache, persistent bleeding from the injection site, or severe pain not relieved by heat or oral medication.    --  Do not make important decisions today.    --  Due to the effects of the block and/or the I.V. Sedation, DO NOT drive or operate hazardous machinery for 12 hours.  Local anesthetics may cause numbness after procedure and precautions must be taken with regards to operating equipment as well as with walking, even if ambulating with assistance of another person or with an assistive device.    --  Do not drink alcohol for 12 hours.    -- You may return to work tomorrow, or as directed by your referring doctor.    --  Occasionally you may notice a slight increase in your pain after the procedure. This should start to improve within the next 24-48 hours. Radiofrequency ablation procedure pain may last 3-4 weeks.    --  It may take as long as 3-4 days before you notice a gradual improvement in your pain and/or other symptoms.    -- You may continue to take your prescribed pain medication as needed.    --  Some normal possible side effects of steroid use could include fluid retention, increased blood sugar, dull headache,  increased sweating, increased appetite, mood swings and flushing.    --  Diabetics are recommended to watch their blood glucose level closely for 24-48 hours after the injection.    --  Must stay in PACU for 20 min upon arrival and prove no leg weakness before being discharged.    --  IN THE EVENT OF A LIFE THREATENING EMERGENCY, (CHEST PAIN, BREATHING DIFFICULTIES, PARALYSIS…) YOU SHOULD GO TO YOUR NEAREST EMERGENCY ROOM.    --  You should be contacted by our office within 2-3 days to schedule follow up or next appointment date.  If not contacted within 7 days, please call the office at [phone]

## 2023-12-18 NOTE — H&P
Brief Pre-procedural / Pre-operative H&P        -----    Pertinent Diagnosis:   Left cervical radiculopathy    Proposed Procedure: Cervical epidural steroid injection      Subjective   Colin Basilio is a 38 y.o. male  who presents for intervention.  He has a history of neck pain.      History of Present Illness     He recently saw my partner in the office and has a history of neck pain.  He has been utilizing a prescription NSAID and also physical therapy and rating his pain continued to severe levels with the neck pain radiating into the left arm and also along with the pain he has some subjective weakness.  Aching and pressure in the neck and burning pain has been noted in the back as well.  Physical therapy helped with stiffness but there was no improvement in pain and also my partner noted no improvement in the left arm radicular type symptoms.  My partner proposed cervical epidural injection which seems very reasonable at this point    -------    The following portions of the patient's history were reviewed and updated as appropriate: allergies, current medications, past family history, past medical history, past social history, past surgical history and problem list.    No Known Allergies      Current Facility-Administered Medications:     fentaNYL citrate (PF) (SUBLIMAZE) injection 50 mcg, 50 mcg, Intravenous, Once, Preston Toledo MD    Midazolam HCl (PF) (VERSED) injection 2 mg, 2 mg, Intravenous, Once, Preston Toledo MD    sodium chloride 0.9 % flush 10 mL, 10 mL, Intravenous, Q12H, Preston Toledo MD    sodium chloride 0.9 % flush 10 mL, 10 mL, Intravenous, PRN, Preston Toledo MD    No current facility-administered medications on file prior to encounter.     Current Outpatient Medications on File Prior to Encounter   Medication Sig Dispense Refill    amLODIPine (NORVASC) 10 MG tablet Take 1 tablet by mouth Daily. 90 tablet 0    atorvastatin (LIPITOR) 40 MG tablet Take 1 tablet by mouth  Daily. 90 tablet 0    busPIRone (BUSPAR) 7.5 MG tablet Take 1 tablet by mouth 3 (Three) Times a Day. for anxiety 90 tablet 1    celecoxib (CeleBREX) 100 MG capsule Take 1 capsule by mouth 2 (Two) Times a Day As Needed for Mild Pain. 60 capsule 0    escitalopram (LEXAPRO) 10 MG tablet Take 1 tablet by mouth Daily. 60 tablet 0    glycopyrrolate (ROBINUL) 2 MG tablet Take 1 tablet by mouth 2 (Two) Times a Day. 90 tablet 0    lansoprazole (PREVACID) 30 MG capsule Take 1 capsule by mouth Daily. 90 capsule 1    metoprolol succinate XL (TOPROL-XL) 50 MG 24 hr tablet Take 1 tablet by mouth Daily. 90 tablet 0       Patient Active Problem List   Diagnosis    Benign essential hypertension    Gastroesophageal reflux disease    Hypertensive disorder    Increased glucose level    Paresthesia of left upper limb    Primary focal hyperhidrosis    Tension-type headache    Cervical radicular pain       Past Medical History:   Diagnosis Date    Anxiety     Cervical disc disorder 9/2023    Recent MRI    Chronic pain disorder 2000    few years    GERD (gastroesophageal reflux disease)     Headache, tension-type 2000    few years, everyday    HLD (hyperlipidemia)     HTN (hypertension)     Low back pain 2001    center of back and left shoulder blade    Neck pain 2000    Few years       History reviewed. No pertinent surgical history.    Family History   Problem Relation Age of Onset    Hypertension Mother     Diabetes Mother     Diabetes Father     Diabetes Sister     Diabetes Brother        Social History     Socioeconomic History    Marital status:    Tobacco Use    Smoking status: Never    Smokeless tobacco: Never   Vaping Use    Vaping Use: Never used   Substance and Sexual Activity    Alcohol use: Yes     Comment: rare    Drug use: Never    Sexual activity: Yes     Partners: Female       -------       Review of Systems  No Fever, No Chills, No ear pain, No sinus pressure or drainage, No eye pain or drainage, No cough, No SOB,  "No chest tightness nor chest pain, no palpitations.          Vitals:    12/18/23 1013 12/18/23 1102   BP: 149/87 148/97   BP Location: Left arm    Patient Position: Lying    Pulse: 75 82   Resp: 16 16   Temp: 97 °F (36.1 °C)    TempSrc: Temporal    SpO2: 97% 97%   Weight: 136 kg (300 lb)    Height: 172.7 cm (68\")          Objective   Physical Exam  VSS, NNR, NCAT, NMNA, NRD, AAOx3.  No cervical area lesions    -------    Assessment & Plan:  - as noted above, the stated intervention is indicated  - Follow-up plan will be noted in the operative report        Has a planned follow-up in 4 weeks      EMR Dragon/Transcription disclaimer:   Typed items in this encounter note may have been created by electronic transcription/translation software which converts spoken language to printed text. The electronic translation of spoken language may permit erroneous, or at times, nonsensical words or phrases to be inadvertently transcribed; Although I have reviewed the note for such errors, some may still exist.      "

## 2024-01-29 ENCOUNTER — OFFICE VISIT (OUTPATIENT)
Dept: FAMILY MEDICINE CLINIC | Facility: CLINIC | Age: 39
End: 2024-01-29
Payer: COMMERCIAL

## 2024-01-29 VITALS
WEIGHT: 301.8 LBS | OXYGEN SATURATION: 97 % | BODY MASS INDEX: 45.74 KG/M2 | HEIGHT: 68 IN | SYSTOLIC BLOOD PRESSURE: 132 MMHG | DIASTOLIC BLOOD PRESSURE: 90 MMHG | HEART RATE: 81 BPM

## 2024-01-29 DIAGNOSIS — E78.00 HYPERCHOLESTEROLEMIA: Primary | ICD-10-CM

## 2024-01-29 DIAGNOSIS — I10 BENIGN ESSENTIAL HYPERTENSION: ICD-10-CM

## 2024-01-29 DIAGNOSIS — R73.03 PREDIABETES: ICD-10-CM

## 2024-01-29 PROCEDURE — 99214 OFFICE O/P EST MOD 30 MIN: CPT | Performed by: NURSE PRACTITIONER

## 2024-01-29 RX ORDER — METOPROLOL SUCCINATE 50 MG/1
75 TABLET, EXTENDED RELEASE ORAL DAILY
Qty: 90 TABLET | Refills: 0 | Status: SHIPPED | OUTPATIENT
Start: 2024-01-29

## 2024-01-29 NOTE — PROGRESS NOTES
Subjective   Colin Basilio is a 38 y.o. male. Presents today for 6  month follow up on his neck pain and for his annual physical  Chief Complaint   Patient presents with    Annual Exam     Neck pain not much improved    Saw [facility]       History Of Present Illness    Physical Therapy for his neck was effective - he continues to stretch daily and it helps.    He has had pain in his left neck with radiation to the left arm for better than 6 months.  He has seen pain management and was injected with steroids.      Hypertension - on amlodipine and metoprolol to control, remains elevated     Anxiety - Buspar prn is working well.    Depression - Lexapro is working well    Hypercholesterolemia  - currently taking atorvastatin    Arthritic pain - Taking Celebrex    GERD  Taking Lansoprazole    Diet - not good the holidays derailed him.    Exercise - He is wanting to begin an exercise program to get back into shape      Patient Active Problem List   Diagnosis    Benign essential hypertension    Gastroesophageal reflux disease    Hypertensive disorder    Increased glucose level    Paresthesia of left upper limb    Primary focal hyperhidrosis    Tension-type headache    Cervical radicular pain       Social History     Socioeconomic History    Marital status:    Tobacco Use    Smoking status: Never    Smokeless tobacco: Never   Vaping Use    Vaping Use: Never used   Substance and Sexual Activity    Alcohol use: Yes     Comment: rare    Drug use: Never    Sexual activity: Yes     Partners: Female       No Known Allergies    Current Outpatient Medications on File Prior to Visit   Medication Sig Dispense Refill    amLODIPine (NORVASC) 10 MG tablet Take 1 tablet by mouth Daily. 90 tablet 0    atorvastatin (LIPITOR) 40 MG tablet Take 1 tablet by mouth Daily. 90 tablet 0    busPIRone (BUSPAR) 7.5 MG tablet Take 1 tablet by mouth 3 (Three) Times a Day. for anxiety 90 tablet 1    celecoxib (CeleBREX) 100  "MG capsule Take 1 capsule by mouth 2 (Two) Times a Day As Needed for Mild Pain. 60 capsule 0    escitalopram (LEXAPRO) 10 MG tablet Take 1 tablet by mouth Daily. 60 tablet 0    glycopyrrolate (ROBINUL) 2 MG tablet Take 1 tablet by mouth 2 (Two) Times a Day. 90 tablet 0    lansoprazole (PREVACID) 30 MG capsule Take 1 capsule by mouth Daily. 90 capsule 1    [DISCONTINUED] metoprolol succinate XL (TOPROL-XL) 50 MG 24 hr tablet Take 1 tablet by mouth Daily. 90 tablet 0     No current facility-administered medications on file prior to visit.       Objective   Vitals:    01/29/24 0807   BP: 132/90   Pulse: 81   SpO2: 97%   Weight: (!) 137 kg (301 lb 12.8 oz)   Height: 172.7 cm (68\")     Body mass index is 45.89 kg/m².    Physical Exam  Constitutional:       Appearance: He is obese.   HENT:      Head: Normocephalic and atraumatic.      Nose: Nose normal.      Mouth/Throat:      Mouth: Mucous membranes are moist.   Eyes:      Pupils: Pupils are equal, round, and reactive to light.   Cardiovascular:      Rate and Rhythm: Normal rate and regular rhythm.      Pulses: Normal pulses.      Heart sounds: Normal heart sounds.   Pulmonary:      Effort: Pulmonary effort is normal.      Breath sounds: Normal breath sounds.   Abdominal:      General: Bowel sounds are normal.   Musculoskeletal:         General: Normal range of motion.   Skin:     General: Skin is warm and dry.      Capillary Refill: Capillary refill takes less than 2 seconds.   Neurological:      General: No focal deficit present.      Mental Status: He is alert.   Psychiatric:         Mood and Affect: Mood normal.       Procedures     Assessment & Plan    Diagnoses and all orders for this visit:    1. Benign essential hypertension  -     metoprolol succinate XL (TOPROL-XL) 50 MG 24 hr tablet; Take 1.5 tablets by mouth Daily.  Dispense: 90 tablet; Refill: 0                       No follow-ups on file.        Answers submitted by the patient for this visit:  Primary " Reason for Visit (Submitted on 1/28/2024)  What is the primary reason for your visit?: Other  Other (Submitted on 1/28/2024)  Please describe your symptoms.: originally was neck but this is just follow up I think.  Have you had these symptoms before?: Yes  How long have you been having these symptoms?: Greater than 2 weeks

## 2024-01-30 LAB
ALBUMIN SERPL-MCNC: 4.4 G/DL (ref 4.1–5.1)
ALBUMIN/GLOB SERPL: 1.8 {RATIO} (ref 1.2–2.2)
ALP SERPL-CCNC: 87 IU/L (ref 44–121)
ALT SERPL-CCNC: 59 IU/L (ref 0–44)
AST SERPL-CCNC: 37 IU/L (ref 0–40)
BASOPHILS # BLD AUTO: 0 X10E3/UL (ref 0–0.2)
BASOPHILS NFR BLD AUTO: 1 %
BILIRUB SERPL-MCNC: 0.4 MG/DL (ref 0–1.2)
BUN SERPL-MCNC: 11 MG/DL (ref 6–24)
BUN/CREAT SERPL: 14 (ref 9–20)
CALCIUM SERPL-MCNC: 9.1 MG/DL (ref 8.7–10.2)
CHLORIDE SERPL-SCNC: 104 MMOL/L (ref 96–106)
CHOLEST SERPL-MCNC: 150 MG/DL (ref 100–199)
CO2 SERPL-SCNC: 24 MMOL/L (ref 20–29)
CREAT SERPL-MCNC: 0.8 MG/DL (ref 0.76–1.27)
EGFRCR SERPLBLD CKD-EPI 2021: 115 ML/MIN/1.73
EOSINOPHIL # BLD AUTO: 0.1 X10E3/UL (ref 0–0.4)
EOSINOPHIL NFR BLD AUTO: 2 %
ERYTHROCYTE [DISTWIDTH] IN BLOOD BY AUTOMATED COUNT: 13.4 % (ref 11.6–15.4)
GLOBULIN SER CALC-MCNC: 2.5 G/DL (ref 1.5–4.5)
GLUCOSE SERPL-MCNC: 105 MG/DL (ref 70–99)
HBA1C MFR BLD: 5.9 % (ref 4.8–5.6)
HCT VFR BLD AUTO: 44.5 % (ref 37.5–51)
HDLC SERPL-MCNC: 41 MG/DL
HGB BLD-MCNC: 14.6 G/DL (ref 13–17.7)
IMM GRANULOCYTES # BLD AUTO: 0 X10E3/UL (ref 0–0.1)
IMM GRANULOCYTES NFR BLD AUTO: 0 %
LDLC SERPL CALC-MCNC: 83 MG/DL (ref 0–99)
LYMPHOCYTES # BLD AUTO: 1.7 X10E3/UL (ref 0.7–3.1)
LYMPHOCYTES NFR BLD AUTO: 32 %
Lab: NORMAL
MCH RBC QN AUTO: 28.3 PG (ref 26.6–33)
MCHC RBC AUTO-ENTMCNC: 32.8 G/DL (ref 31.5–35.7)
MCV RBC AUTO: 86 FL (ref 79–97)
MONOCYTES # BLD AUTO: 0.4 X10E3/UL (ref 0.1–0.9)
MONOCYTES NFR BLD AUTO: 8 %
NEUTROPHILS # BLD AUTO: 3.1 X10E3/UL (ref 1.4–7)
NEUTROPHILS NFR BLD AUTO: 57 %
PLATELET # BLD AUTO: 245 X10E3/UL (ref 150–450)
POTASSIUM SERPL-SCNC: 4.4 MMOL/L (ref 3.5–5.2)
PROT SERPL-MCNC: 6.9 G/DL (ref 6–8.5)
RBC # BLD AUTO: 5.16 X10E6/UL (ref 4.14–5.8)
SODIUM SERPL-SCNC: 143 MMOL/L (ref 134–144)
TRIGL SERPL-MCNC: 147 MG/DL (ref 0–149)
VLDLC SERPL CALC-MCNC: 26 MG/DL (ref 5–40)
WBC # BLD AUTO: 5.4 X10E3/UL (ref 3.4–10.8)

## 2024-04-15 ENCOUNTER — OFFICE VISIT (OUTPATIENT)
Dept: FAMILY MEDICINE CLINIC | Facility: CLINIC | Age: 39
End: 2024-04-15
Payer: COMMERCIAL

## 2024-04-15 VITALS
TEMPERATURE: 98.9 F | HEIGHT: 68 IN | OXYGEN SATURATION: 97 % | SYSTOLIC BLOOD PRESSURE: 154 MMHG | BODY MASS INDEX: 44.68 KG/M2 | DIASTOLIC BLOOD PRESSURE: 78 MMHG | HEART RATE: 87 BPM | WEIGHT: 294.8 LBS

## 2024-04-15 DIAGNOSIS — H69.93 DYSFUNCTION OF BOTH EUSTACHIAN TUBES: Primary | ICD-10-CM

## 2024-04-15 PROCEDURE — 99213 OFFICE O/P EST LOW 20 MIN: CPT | Performed by: NURSE PRACTITIONER

## 2024-04-15 RX ORDER — PREDNISONE 10 MG/1
TABLET ORAL
Qty: 32 TABLET | Refills: 0 | Status: SHIPPED | OUTPATIENT
Start: 2024-04-15

## 2024-04-15 RX ORDER — FLUTICASONE PROPIONATE 50 MCG
2 SPRAY, SUSPENSION (ML) NASAL DAILY
Qty: 16 G | Refills: 6 | Status: SHIPPED | OUTPATIENT
Start: 2024-04-15

## 2024-04-15 NOTE — PROGRESS NOTES
Subjective   Colin Basilio is a 39 y.o. male. Presents today for   Chief Complaint   Patient presents with    Earache     Ringing in ears X's 3-4 days ago       History Of Present Illness  He has had pain in his left ear for x 3 days.  He has not tried any treatments.  He had a similar episode about 8 years ago and got a steroid that cleared him up.      Patient Active Problem List   Diagnosis    Benign essential hypertension    Gastroesophageal reflux disease    Hypertensive disorder    Increased glucose level    Paresthesia of left upper limb    Primary focal hyperhidrosis    Tension-type headache    Cervical radicular pain       Social History     Socioeconomic History    Marital status:    Tobacco Use    Smoking status: Never    Smokeless tobacco: Never   Vaping Use    Vaping status: Never Used   Substance and Sexual Activity    Alcohol use: Yes     Comment: rare    Drug use: Never    Sexual activity: Yes     Partners: Female       No Known Allergies    Current Outpatient Medications on File Prior to Visit   Medication Sig Dispense Refill    amLODIPine (NORVASC) 10 MG tablet Take 1 tablet by mouth Daily. 90 tablet 0    atorvastatin (LIPITOR) 40 MG tablet Take 1 tablet by mouth Daily. 90 tablet 0    busPIRone (BUSPAR) 7.5 MG tablet Take 1 tablet by mouth 3 (Three) Times a Day. for anxiety 90 tablet 1    celecoxib (CeleBREX) 100 MG capsule Take 1 capsule by mouth 2 (Two) Times a Day As Needed for Mild Pain. 60 capsule 0    escitalopram (LEXAPRO) 10 MG tablet Take 1 tablet by mouth Daily. 60 tablet 0    glycopyrrolate (ROBINUL) 2 MG tablet Take 1 tablet by mouth 2 (Two) Times a Day. 90 tablet 0    lansoprazole (PREVACID) 30 MG capsule Take 1 capsule by mouth Daily. 90 capsule 1    metoprolol succinate XL (TOPROL-XL) 50 MG 24 hr tablet Take 1.5 tablets by mouth Daily. 90 tablet 0     No current facility-administered medications on file prior to visit.       Objective   Vitals:    04/15/24 1335   BP:  "154/78   Pulse: 87   Temp: 98.9 °F (37.2 °C)   SpO2: 97%   Weight: 134 kg (294 lb 12.8 oz)   Height: 172.7 cm (68\")     Body mass index is 44.82 kg/m².    Physical Exam  Constitutional:       Appearance: He is obese.   HENT:      Head: Normocephalic and atraumatic.      Right Ear: Tympanic membrane normal.      Left Ear: Tympanic membrane normal.      Ears:      Comments: Clear fluid behind both TM's     Mouth/Throat:      Mouth: Mucous membranes are moist.   Eyes:      Pupils: Pupils are equal, round, and reactive to light.   Cardiovascular:      Rate and Rhythm: Normal rate and regular rhythm.      Pulses: Normal pulses.      Heart sounds: Normal heart sounds.   Pulmonary:      Effort: Pulmonary effort is normal.      Breath sounds: Normal breath sounds.   Abdominal:      General: Bowel sounds are normal.   Musculoskeletal:         General: Normal range of motion.   Skin:     General: Skin is warm and dry.      Capillary Refill: Capillary refill takes less than 2 seconds.   Neurological:      General: No focal deficit present.      Mental Status: He is alert.   Psychiatric:         Mood and Affect: Mood normal.     Procedures     Assessment & Plan   Diagnoses and all orders for this visit:    1. Dysfunction of both eustachian tubes (Primary)  -     fluticasone (FLONASE) 50 MCG/ACT nasal spray; 2 sprays into the nostril(s) as directed by provider Daily.  Dispense: 16 g; Refill: 6  -     predniSONE (DELTASONE) 10 MG tablet; 6 tabs po qd x 2 days, 4 tabs po qd x 2 days, 3 tabs po qd x 2 days, 2 tabs po qd x 2 days, 1 tab po qd x 2 days  Dispense: 32 tablet; Refill: 0     Discussed Care Gaps, ordered referrals and encouraged vaccination updates.       - Pt agrees with plan of care and denies further questions/concerns today  - This document is intended for medical expert use only. Persons  reading this document without medical staff guidance may result in misinterpretation and unintended morbidity     Go to the ER for " any possible life-threatening symptoms such as chest pain or shortness of air.      Please allow 3-5 business days for recommendations based on new results      I personally spent time with this patient, preparing for the visit, reviewing tests, obtaining and/or reviewing a separately obtained history, performing a medically appropriate examination and/or evaluation, counseling and educating the patient/family/caregiver, ordering medications,  documenting information in the medical record and indepentently interpreting results.                      Return in about 3 months (around 7/30/2024) for Next scheduled follow up.

## 2024-04-24 ENCOUNTER — OFFICE VISIT (OUTPATIENT)
Dept: PAIN MEDICINE | Facility: CLINIC | Age: 39
End: 2024-04-24
Payer: COMMERCIAL

## 2024-04-24 VITALS
HEART RATE: 56 BPM | BODY MASS INDEX: 44.86 KG/M2 | OXYGEN SATURATION: 98 % | WEIGHT: 296 LBS | SYSTOLIC BLOOD PRESSURE: 125 MMHG | DIASTOLIC BLOOD PRESSURE: 76 MMHG | HEIGHT: 68 IN | TEMPERATURE: 98.3 F

## 2024-04-24 DIAGNOSIS — R20.2 NUMBNESS AND TINGLING OF LEFT UPPER AND LOWER EXTREMITY: Primary | ICD-10-CM

## 2024-04-24 DIAGNOSIS — R42 DIZZINESS: ICD-10-CM

## 2024-04-24 DIAGNOSIS — R20.0 NUMBNESS AND TINGLING OF LEFT UPPER AND LOWER EXTREMITY: Primary | ICD-10-CM

## 2024-04-24 DIAGNOSIS — M54.12 CERVICAL RADICULOPATHY: ICD-10-CM

## 2024-04-24 DIAGNOSIS — M54.81 BILATERAL OCCIPITAL NEURALGIA: ICD-10-CM

## 2024-04-24 PROCEDURE — 99214 OFFICE O/P EST MOD 30 MIN: CPT | Performed by: NURSE PRACTITIONER

## 2024-04-24 NOTE — PROGRESS NOTES
CHIEF COMPLAINT  F/U neck pain- cervical epidural steroid injection at C6/7- patient states that he had no improvement from the procedure.        Subjective   Colin Basilio is a 39 y.o. male  who presents to the office for follow-up of procedure.  He completed a SALMA at C6/7   on  12/18/2024 performed by Dr. Toledo for management of neck pain. Patient reports No relief from the procedure.     Today his pain is 8/10VAS in severity. He describes his neck pain as continuous aching, burning, and stiffness pain. This pain is radiating into his left upper extremity. He states that his arm pain is primarily from the left elbow down to his hand, he does have pain in his left shoulder blade that does go into the upper arm as well.  His left arm pain is intermittent and he is unsure what triggers this.     He also reports daily headaches. This pain is located in the occiput and radiates to the apex of his skull. He is tender at the cervicooccipital junction bilaterally     At his last office visit he was referred to PT for his low back pain. He has not completed this. He states that his low back pain is not currently his priority.     Past pain medications: OTC Tylenol, Ibuprofen, Salon Pas, Celebrex--no improvement     Current pain medications: None     Past therapies:  Physical Therapy: Yes, for neck pain, helped with stiffness but no improvement in neck pain and left arm symptoms.   Chiropractor: Yes, Once, many years ago  Massage Therapy: None  TENS: None  Neck or back surgery: None  Past pain management: None    Neck Pain   This is a chronic problem. The current episode started more than 1 year ago. The problem occurs constantly. The problem has been unchanged (remains unstable). The pain is present in the left side. The quality of the pain is described as burning and stabbing. The pain is at a severity of 8/10. Exacerbated by: working on a computer, position with sleep, ROM. The pain is Same all the time. He has  tried heat and ice for the symptoms.      PEG Assessment   What number best describes your pain on average in the past week?8  What number best describes how, during the past week, pain has interfered with your enjoyment of life?10  What number best describes how, during the past week, pain has interfered with your general activity?  0    Review of Pertinent Medical Data ---  MRI EXAMINATION OF THE CERVICAL SPINE WITHOUT CONTRAST     HISTORY: Neck pain, left radiculopathy.     COMPARISON: None.     FINDINGS: The alignment of the cervical spine is within normal limits.  Signal intensity within the cord is normal on the axial T2 sequence.     C2-C3: There is no evidence of disc bulge or herniation.     C3-C4: There is no evidence of disc bulge or herniation.     C4-C5: Mild uncovertebral degenerative disease is present on the left.     C5-C6: Mild uncovertebral degenerative disease is present on the right  contributing to mild foraminal stenosis.     C6-C7: A mild central disc-osteophyte complex is present which is  slightly more prominent to the left. It abuts and minimally flattens the  ventral surface of the cord to the left of midline. Mild-to-moderate  foraminal stenosis is present on the left secondary to uncovertebral  degenerative disease.     C7-T1: There is no evidence of disc bulge or herniation.     IMPRESSION:  1. There is no evidence of a focal herniation or of cord signal  abnormality/cord compression.   2. Multilevel degenerative disease involving cervical spine is noted as  described above including a small left paracentral disc bulge or  protrusion at C6-C7 which abuts and mildly flattens the anterolateral  aspect of the cord to the left. At C6-C7, there is mild-to-moderate  foraminal stenosis on the left secondary to uncovertebral degenerative  disease. See above.     This report was finalized on 9/1/2023 5:07 PM by Dr. Naeem Romero M.D.    The following portions of the patient's history were  "reviewed and updated as appropriate: allergies, current medications, past family history, past medical history, past social history, past surgical history, and problem list.    Review of Systems   Constitutional:  Negative for activity change, chills and fatigue.   HENT:  Negative for congestion.    Eyes:  Negative for visual disturbance.   Respiratory:  Negative for chest tightness and shortness of breath.    Gastrointestinal:  Negative for constipation and diarrhea.   Genitourinary:  Negative for difficulty urinating.   Neurological:  Positive for dizziness and light-headedness.   Psychiatric/Behavioral:  Positive for sleep disturbance. Negative for agitation. The patient is not nervous/anxious.        --  The aforementioned information the Chief Complaint section and above subjective data including any HPI data, and also the Review of Systems data, has been personally reviewed and affirmed.  --     Vitals:    04/24/24 1030   BP: 125/76   Pulse: 56   Temp: 98.3 °F (36.8 °C)   SpO2: 98%   Weight: 134 kg (296 lb)   Height: 172.7 cm (68\")   PainSc:   8   PainLoc: Neck  Comment: and left arm     Objective   Physical Exam  Vitals and nursing note reviewed.   Constitutional:       Appearance: Normal appearance. He is well-developed.   Eyes:      General: Lids are normal.   Neck:     Cardiovascular:      Rate and Rhythm: Normal rate.   Pulmonary:      Effort: Pulmonary effort is normal.   Musculoskeletal:      Cervical back: Tenderness and bony tenderness present. Pain with movement present. Decreased range of motion.   Neurological:      Mental Status: He is alert and oriented to person, place, and time.   Psychiatric:         Attention and Perception: Attention normal.         Mood and Affect: Mood normal.         Speech: Speech normal.         Behavior: Behavior normal.         Judgment: Judgment normal.       Assessment & Plan   Diagnoses and all orders for this visit:    1. Numbness and tingling of left upper and " lower extremity (Primary)  -     Ambulatory Referral to Neurology    2. Dizziness  -     Ambulatory Referral to Neurology    3. Cervical radiculopathy  -     EMG & Nerve Conduction Test; Future    4. Bilateral occipital neuralgia  -     Case Request      Colin Basilio reports a pain score of 8.  Given his pain assessment as noted, treatment options were discussed and the following options were decided upon as a follow-up plan to address the patient's pain: referral to specialist for assistance in pain treatment guidance, steroid injections, and diagnostic testing ordered .    --- Plan for Bilateral Occipital nerve blocks with Dr. Toledo in office  Reviewed the procedure at length with the patient.  Included in the review was expectations, complications, risk and benefits.The procedure was described in detail and the risks, benefits and alternatives were discussed with the patient (including but not limited to: bleeding, infection, nerve damage, worsening of pain, inability to perform injection, paralysis, seizures, coma, no pain relief and death) who agreed to proceed.  The procedure will plan to be performed at [facility] pain management office and could potentially contain steroids. Questions were answered and in a way the patient could understand.  Patient verbalized understanding and wishes to proceed.  This intervention will be ordered.  Patient has no evidence of coagulopathy or current infection.  --- EMG/NCS for left upper extremity.   --- He states that he had vertigo years ago and immediately after that had numbness on the left side of his body. He states that since this time has had continued to have issues with pain and numbness on the left side of his body. He also reports intermittently feeling like he will faint. This is not related to anything in particular. I recommend referral to medical neurology for evaluation.   --- Follow-up for procedure with Dr. Toledo.      Jeffry utilizing Caroline  dictation.

## 2024-04-26 ENCOUNTER — PATIENT MESSAGE (OUTPATIENT)
Dept: FAMILY MEDICINE CLINIC | Facility: CLINIC | Age: 39
End: 2024-04-26
Payer: COMMERCIAL

## 2024-04-26 DIAGNOSIS — H93.13 TINNITUS OF BOTH EARS: Primary | ICD-10-CM

## 2024-04-26 NOTE — TELEPHONE ENCOUNTER
From: Colin Basilio  To: Ashly Oneal  Sent: 4/26/2024 4:00 PM EDT  Subject: Ringing in Ears    I have taken all of the medicine and I am still having terrible ringing in the ears.

## 2024-05-20 ENCOUNTER — PROCEDURE VISIT (OUTPATIENT)
Dept: PAIN MEDICINE | Facility: CLINIC | Age: 39
End: 2024-05-20
Payer: COMMERCIAL

## 2024-05-20 VITALS
SYSTOLIC BLOOD PRESSURE: 135 MMHG | DIASTOLIC BLOOD PRESSURE: 95 MMHG | HEART RATE: 106 BPM | WEIGHT: 281.8 LBS | HEIGHT: 68 IN | OXYGEN SATURATION: 96 % | BODY MASS INDEX: 42.71 KG/M2 | TEMPERATURE: 96.9 F

## 2024-05-20 DIAGNOSIS — M54.81 BILATERAL OCCIPITAL NEURALGIA: Primary | ICD-10-CM

## 2024-05-20 PROCEDURE — 64405 NJX AA&/STRD GR OCPL NRV: CPT | Performed by: ANESTHESIOLOGY

## 2024-05-20 NOTE — PROGRESS NOTES
"CHIEF COMPLAINT: Neck Pain  Patient complain of ringing in both ears.    Colin Basilio is a 39 y.o. male.  He is here for the following procedure:  bilateral occipital nerve block.     Vitals:    05/20/24 1255   BP: 135/95   BP Location: Left arm   Patient Position: Sitting   Cuff Size: Large Adult   Pulse: 106   Temp: 96.9 °F (36.1 °C)   SpO2: 96%   Weight: 128 kg (281 lb 12.8 oz)   Height: 172.7 cm (68\")   PainSc:   9       Sensorcaine-MPF(Bupivacaine HCI injection,USP) 0.25% Lot#: [id number] Exp: 09/30/27  NDC: 05671-462-19  Solu- Medrol 40 mg/ml Lot#: [id number] Exp: 05/2025  NDC: 2059-3319-99     Procedures    Bilateral occipital nerve block:    Informed consent was obtained.  We discussed items including but not limited to bleeding and nerve injury and infection and paralysis and stroke and death.    A solution was prepared of 10 mL of 0.25% bupivacaine and 80 mg of solu-Medrol.    The left and right occipital areas were cleansed with alcohol ×2 and then a 25-gauge needle was inserted just medial to each of the occipital arteries until contacting periosteum and aspiration was negative and then 4 mL of the above solution was injected.  The needle at each location was withdrawn to the skin and then redirected laterally and aspiration was checked and then I proceeded to inject another 2  milliliters of medication in the vicinity of the lesser occipital branch.  Again this was bilaterally performed.      The needle was removed intact.  Bleeding was minimal.  No apparent complications.        Visit Diagnoses:  1. Bilateral occipital neuralgia        Preston Toledo MD  Pain Management    --    Vitals:    05/20/24 1255   PainSc:   9          Colin Basilio reports a pain score of 9.  Given his pain assessment as noted, treatment options were discussed and the following options were decided upon as a follow-up plan to address the patient's pain: continuation of current treatment plan for pain and steroid " injections.

## 2024-06-18 ENCOUNTER — OFFICE VISIT (OUTPATIENT)
Dept: PAIN MEDICINE | Facility: CLINIC | Age: 39
End: 2024-06-18
Payer: COMMERCIAL

## 2024-06-18 VITALS
TEMPERATURE: 97.8 F | HEIGHT: 68 IN | SYSTOLIC BLOOD PRESSURE: 128 MMHG | WEIGHT: 288.6 LBS | HEART RATE: 57 BPM | DIASTOLIC BLOOD PRESSURE: 97 MMHG | BODY MASS INDEX: 43.74 KG/M2 | OXYGEN SATURATION: 98 %

## 2024-06-18 DIAGNOSIS — G89.29 CHRONIC LEFT-SIDED LOW BACK PAIN WITH LEFT-SIDED SCIATICA: Primary | ICD-10-CM

## 2024-06-18 DIAGNOSIS — R20.2 NUMBNESS AND TINGLING OF LEFT UPPER AND LOWER EXTREMITY: ICD-10-CM

## 2024-06-18 DIAGNOSIS — M54.12 CERVICAL RADICULOPATHY: ICD-10-CM

## 2024-06-18 DIAGNOSIS — M54.81 BILATERAL OCCIPITAL NEURALGIA: ICD-10-CM

## 2024-06-18 DIAGNOSIS — R20.0 NUMBNESS AND TINGLING OF LEFT UPPER AND LOWER EXTREMITY: ICD-10-CM

## 2024-06-18 DIAGNOSIS — M54.42 CHRONIC LEFT-SIDED LOW BACK PAIN WITH LEFT-SIDED SCIATICA: Primary | ICD-10-CM

## 2024-06-18 PROCEDURE — 99213 OFFICE O/P EST LOW 20 MIN: CPT | Performed by: NURSE PRACTITIONER

## 2024-06-18 NOTE — PROGRESS NOTES
CHIEF COMPLAINT  Neck pain  Follow up    Subjective   Colin Basilio is a 39 y.o. male  who presents for follow-up.  He has a history of neck pain.  He completed bilateral occipital nerve blocks on 5/20/2024 performed by Dr. Toledo. He reports minimal relief from this procedure.    Today his pain is 8/10VAS in severity. He continues to complain of left ear, neck, and arm pain as well as left low back, leg, and foot pain. He states that his left leg will feel weak at times, but notes that this is not significant. He is scheduled for EMG of the left upper extremity with Dr. Grewal, he is also scheduled for evaluation with Dr. Grewal d/t the diffuse nature of his left sided symptoms.     Procedure list:  12/18/2024-SALMA at C6/7-no relief    Past pain medications: OTC Tylenol, Ibuprofen, Salon Pas, Celebrex--no improvement     Current pain medications: None     Past therapies:  Physical Therapy: Yes, for neck pain, helped with stiffness but no improvement in neck pain and left arm symptoms.   Chiropractor: Yes, Once, many years ago  Massage Therapy: None  TENS: None  Neck or back surgery: None  Past pain management: None    Neck Pain   This is a chronic problem. The current episode started more than 1 year ago. The problem occurs constantly. The problem has been unchanged (remains unstable). The pain is present in the left side (radiating into the left upper extremity). The quality of the pain is described as burning and stabbing. The pain is at a severity of 8/10. Exacerbated by: working on a computer, position with sleep, ROM. The pain is Same all the time. Associated symptoms include headaches and numbness. Pertinent negatives include no weakness. He has tried heat and ice for the symptoms.   Back Pain  This is a chronic problem. The current episode started more than 1 month ago. The problem occurs intermittently. The problem has been worse since onset. The pain is present in the lumbar spine. The quality of the  "pain is described as shooting. The pain radiates to the left thigh and left foot. The pain is at a severity of 6/10. The symptoms are aggravated by sitting. Associated symptoms include headaches and numbness. Pertinent negatives include no abdominal pain, dysuria or weakness. He has tried NSAIDs for the symptoms.      PEG Assessment   What number best describes your pain on average in the past week?8  What number best describes how, during the past week, pain has interfered with your enjoyment of life?7  What number best describes how, during the past week, pain has interfered with your general activity?  7    The following portions of the patient's history were reviewed and updated as appropriate: allergies, current medications, past family history, past medical history, past social history, past surgical history, and problem list.    Review of Systems   Constitutional:  Positive for fatigue. Negative for activity change.   Gastrointestinal:  Negative for abdominal pain, constipation and diarrhea.   Genitourinary:  Negative for difficulty urinating and dysuria.   Musculoskeletal:  Positive for back pain and neck pain.   Neurological:  Positive for numbness and headaches. Negative for dizziness, weakness and light-headedness.   Psychiatric/Behavioral:  Negative for agitation, sleep disturbance and suicidal ideas. The patient is not nervous/anxious.      --  The aforementioned information the Chief Complaint section and above subjective data including any HPI data, and also the Review of Systems data, has been personally reviewed and affirmed.  --    Vitals:    06/18/24 1329   BP: 128/97   BP Location: Left arm   Patient Position: Sitting   Cuff Size: Large Adult   Pulse: 57   Temp: 97.8 °F (36.6 °C)   SpO2: 98%   Weight: 131 kg (288 lb 9.6 oz)   Height: 172.7 cm (68\")   PainSc:   8   PainLoc: Neck     Objective   Physical Exam  Vitals and nursing note reviewed.   Constitutional:       Appearance: Normal appearance. " He is well-developed.   Eyes:      General: Lids are normal.   Cardiovascular:      Rate and Rhythm: Normal rate.   Pulmonary:      Effort: Pulmonary effort is normal.   Musculoskeletal:      Cervical back: Tenderness present.      Lumbar back: Tenderness present. Decreased range of motion.   Neurological:      Mental Status: He is alert and oriented to person, place, and time.   Psychiatric:         Attention and Perception: Attention normal.         Mood and Affect: Mood normal.         Speech: Speech normal.         Behavior: Behavior normal.         Judgment: Judgment normal.       Assessment & Plan   Diagnoses and all orders for this visit:    1. Chronic left-sided low back pain with left-sided sciatica (Primary)  -     Ambulatory Referral to Physical Therapy    2. Bilateral occipital neuralgia    3. Numbness and tingling of left upper and lower extremity    4. Cervical radiculopathy      I spent 20 minutes caring for Colin on this date of service. This time includes time spent by me in the following activities: preparing for the visit, performing a medically appropriate examination and/or evaluation, counseling and educating the patient/family/caregiver, documenting information in the medical record, reviewing a separately obtained history, and ordering referral to PT        Colin Basilio reports a pain score of 8.  Given his pain assessment as noted, treatment options were discussed and the following options were decided upon as a follow-up plan to address the patient's pain: continuation of current treatment plan for pain.    --- No plans to repeat SALMA or Occipital nerve blocks as these were not helpful.  Keep LUE EMG/NCS appt in July. May consider Lyrica or Gabapentin, will wait on this result first.   --- Will send to PT for his low back and left leg pain. If no improvement then will get a lumbar MRI and could consider LESI if appropriate.   --- Follow-up in approximately 1 month after completion of  EMG and after he has been able to start PT for his low back.      Dictated utilizing Dragon dictation.

## 2024-07-30 ENCOUNTER — OFFICE VISIT (OUTPATIENT)
Dept: FAMILY MEDICINE CLINIC | Facility: CLINIC | Age: 39
End: 2024-07-30
Payer: COMMERCIAL

## 2024-07-30 VITALS
DIASTOLIC BLOOD PRESSURE: 72 MMHG | SYSTOLIC BLOOD PRESSURE: 118 MMHG | WEIGHT: 260 LBS | HEART RATE: 85 BPM | HEIGHT: 68 IN | BODY MASS INDEX: 39.4 KG/M2 | OXYGEN SATURATION: 97 %

## 2024-07-30 DIAGNOSIS — I10 PRIMARY HYPERTENSION: Primary | ICD-10-CM

## 2024-07-30 DIAGNOSIS — E78.2 MIXED HYPERLIPIDEMIA: ICD-10-CM

## 2024-07-30 LAB
BASOPHILS # BLD AUTO: 0.04 10*3/MM3 (ref 0–0.2)
BASOPHILS NFR BLD AUTO: 0.7 % (ref 0–1.5)
BUN SERPL-MCNC: 13 MG/DL (ref 6–20)
BUN/CREAT SERPL: 15.5 (ref 7–25)
CALCIUM SERPL-MCNC: 9.2 MG/DL (ref 8.6–10.5)
CHLORIDE SERPL-SCNC: 101 MMOL/L (ref 98–107)
CHOLEST SERPL-MCNC: 141 MG/DL (ref 0–200)
CO2 SERPL-SCNC: 26.4 MMOL/L (ref 22–29)
CREAT SERPL-MCNC: 0.84 MG/DL (ref 0.76–1.27)
EGFRCR SERPLBLD CKD-EPI 2021: 112.4 ML/MIN/1.73
EOSINOPHIL # BLD AUTO: 0.12 10*3/MM3 (ref 0–0.4)
EOSINOPHIL NFR BLD AUTO: 2 % (ref 0.3–6.2)
ERYTHROCYTE [DISTWIDTH] IN BLOOD BY AUTOMATED COUNT: 12.9 % (ref 12.3–15.4)
GLUCOSE SERPL-MCNC: 102 MG/DL (ref 65–99)
HCT VFR BLD AUTO: 42.7 % (ref 37.5–51)
HDLC SERPL-MCNC: 37 MG/DL (ref 40–60)
HGB BLD-MCNC: 14.3 G/DL (ref 13–17.7)
IMM GRANULOCYTES # BLD AUTO: 0.02 10*3/MM3 (ref 0–0.05)
IMM GRANULOCYTES NFR BLD AUTO: 0.3 % (ref 0–0.5)
LDLC SERPL CALC-MCNC: 79 MG/DL (ref 0–100)
LYMPHOCYTES # BLD AUTO: 1.89 10*3/MM3 (ref 0.7–3.1)
LYMPHOCYTES NFR BLD AUTO: 32.3 % (ref 19.6–45.3)
MCH RBC QN AUTO: 28.4 PG (ref 26.6–33)
MCHC RBC AUTO-ENTMCNC: 33.5 G/DL (ref 31.5–35.7)
MCV RBC AUTO: 84.7 FL (ref 79–97)
MONOCYTES # BLD AUTO: 0.45 10*3/MM3 (ref 0.1–0.9)
MONOCYTES NFR BLD AUTO: 7.7 % (ref 5–12)
NEUTROPHILS # BLD AUTO: 3.34 10*3/MM3 (ref 1.7–7)
NEUTROPHILS NFR BLD AUTO: 57 % (ref 42.7–76)
NRBC BLD AUTO-RTO: 0 /100 WBC (ref 0–0.2)
PLATELET # BLD AUTO: 250 10*3/MM3 (ref 140–450)
POTASSIUM SERPL-SCNC: 4.7 MMOL/L (ref 3.5–5.2)
RBC # BLD AUTO: 5.04 10*6/MM3 (ref 4.14–5.8)
SODIUM SERPL-SCNC: 137 MMOL/L (ref 136–145)
TRIGL SERPL-MCNC: 139 MG/DL (ref 0–150)
VLDLC SERPL CALC-MCNC: 25 MG/DL (ref 5–40)
WBC # BLD AUTO: 5.86 10*3/MM3 (ref 3.4–10.8)

## 2024-07-30 PROCEDURE — 99213 OFFICE O/P EST LOW 20 MIN: CPT | Performed by: NURSE PRACTITIONER

## 2024-07-30 NOTE — PROGRESS NOTES
Subjective   Colin Basilio is a 39 y.o. male. Presents today for   Chief Complaint   Patient presents with    Annual Exam         HPI Annual physical and 6 month follow up    Chronic nerve pain:  Sees neurologist tomorrow in [place]    Past Medical History:   Diagnosis Date    Anxiety     Cervical disc disorder 9/2023    Recent MRI    Chronic pain disorder 2000    few years    Extremity pain 1/1/2022    left arm    GERD (gastroesophageal reflux disease)     Headache     Headache, tension-type 2000    few years, everyday    HLD (hyperlipidemia)     HTN (hypertension)     Low back pain 2001    center of back and left shoulder blade    Migraine     Neck pain 2000    Few years       Past Surgical History:   Procedure Laterality Date    CERVICAL EPIDURAL N/A 12/18/2023    Procedure: cervical epidural steroid injection at C6/7 33776;  Surgeon: Preston Toledo MD;  Location: [facility] [place] MAIN OR;  Service: Pain Management;  Laterality: N/A;        No Known Allergies    Current Outpatient Medications on File Prior to Visit   Medication Sig Dispense Refill    amLODIPine (NORVASC) 10 MG tablet Take 1 tablet by mouth Daily. 90 tablet 0    atorvastatin (LIPITOR) 40 MG tablet Take 1 tablet by mouth Daily. 90 tablet 0    busPIRone (BUSPAR) 7.5 MG tablet Take 1 tablet by mouth 3 (Three) Times a Day. for anxiety 90 tablet 1    celecoxib (CeleBREX) 100 MG capsule Take 1 capsule by mouth 2 (Two) Times a Day As Needed for Mild Pain. 60 capsule 0    escitalopram (LEXAPRO) 10 MG tablet Take 1 tablet by mouth Daily. 60 tablet 0    glycopyrrolate (ROBINUL) 2 MG tablet Take 1 tablet by mouth 2 (Two) Times a Day. 90 tablet 0    lansoprazole (PREVACID) 30 MG capsule Take 1 capsule by mouth Daily. 90 capsule 1    metoprolol succinate XL (TOPROL-XL) 50 MG 24 hr tablet Take 1.5 tablets by mouth Daily. 90 tablet 0    [DISCONTINUED] fluticasone (FLONASE) 50 MCG/ACT nasal spray 2 sprays into the nostril(s) as directed by provider Daily. (Patient  "not taking: Reported on 7/30/2024) 16 g 6    [DISCONTINUED] predniSONE (DELTASONE) 10 MG tablet 6 tabs po qd x 2 days, 4 tabs po qd x 2 days, 3 tabs po qd x 2 days, 2 tabs po qd x 2 days, 1 tab po qd x 2 days (Patient not taking: Reported on 7/30/2024) 32 tablet 0     No current facility-administered medications on file prior to visit.       Social History     Socioeconomic History    Marital status:    Tobacco Use    Smoking status: Never    Smokeless tobacco: Never   Vaping Use    Vaping status: Never Used   Substance and Sexual Activity    Alcohol use: Yes     Comment: very rarely    Drug use: Never    Sexual activity: Yes     Partners: Female       Occupation/Lives with: . Lives with wife    Sleep: Gets 7 hours of sleep/night    Exercise: trying to walk more    Nutrition: better, he is working on losing weight and eating better    Caffeine: coffee  - 2 cups per day    Tobb/Vaping/Illicit Drugs/ETOH: rarely drinks alcohol, no to others    Sexual hx (#partners, m/f, bc, LMP): wife    Mood: good    Safety: Feels safe at home with the people he/she is around.    Health Maintenance/Labs: will be done today    Last eye exam:  A few years    Last dentist visit:  2 months ago    Specialists: neurology  ___________________________  Review of Systems   Denies dizziness, fatigue, fever/chills, CP, SOA, blood in urine/stool, abd pain, leg swelling.    Positive for headaches - seeing Neurology tomorrow    Objective   Vitals:    07/30/24 0950   BP: 118/72   Pulse: 85   SpO2: 97%   Weight: 118 kg (260 lb)   Height: 172.7 cm (68\")     Body mass index is 39.53 kg/m².    Physical Exam  Constitutional:       Appearance: He is obese.   HENT:      Head: Normocephalic and atraumatic.      Nose: Nose normal.      Mouth/Throat:      Mouth: Mucous membranes are moist.   Eyes:      Pupils: Pupils are equal, round, and reactive to light.   Cardiovascular:      Rate and Rhythm: Normal rate and regular rhythm.      " Pulses: Normal pulses.      Heart sounds: Normal heart sounds.   Pulmonary:      Effort: Pulmonary effort is normal.      Breath sounds: Normal breath sounds.   Abdominal:      General: Bowel sounds are normal.   Musculoskeletal:         General: Normal range of motion.   Skin:     General: Skin is warm and dry.      Capillary Refill: Capillary refill takes less than 2 seconds.   Neurological:      General: No focal deficit present.      Mental Status: He is alert.      Comments: CN II-XII grossly intact on exam AEB following finger with eyes, puffing cheeks, sticking out tongue, wiggling tongue, raising eyebrows, and shrugging shoulders.   Patient has equal push pull of upper and lower extremities.  Patient can heel walk, toe walk, heel-toe walk, squat and duck walk without difficulty.  There is no s/s of scoliosis while patient bends over and this provider palpates spine.     Psychiatric:         Mood and Affect: Mood normal.     Procedures     Assessment & Plan   Diagnoses and all orders for this visit:    1. Primary hypertension (Primary)  -     CBC & Differential  -     Basic Metabolic Panel    2. Mixed hyperlipidemia  -     Lipid Panel    3.  Follow up with Neurology as scheduled.    Body mass index is 39.53 kg/m².      Return in about 6 months (around 1/30/2025).     Counseled on a healthy diet. Use condoms 100% of time with sex as IUD does not protect against STIs. Eat a healthy diet and exercise routinely. Avoid smoking/alcohol and drugs. Use seatbelt 100% of time.     Discussed Care Gaps, ordered referrals and encouraged vaccination updates.       - Pt agrees with plan of care and denies further questions/concerns today  - This document is intended for medical expert use only. Persons  reading this document without medical staff guidance may result in misinterpretation and unintended morbidity     Go to the ER for any possible life-threatening symptoms such as chest pain or shortness of air.      Please  allow 3-5 business days for recommendations based on new results      I personally spent time with this patient, preparing for the visit, reviewing tests, obtaining and/or reviewing a separately obtained history, performing a medically appropriate examination and/or evaluation, counseling and educating the patient/family/caregiver, ordering medications,  documenting information in the medical record and indepentently interpreting results.

## 2024-07-30 NOTE — ADDENDUM NOTE
Addended by: MARY ANNE BENITEZ on: 7/30/2024 11:01 AM     Modules accepted: Orders, Level of Service

## 2024-08-05 ENCOUNTER — OFFICE VISIT (OUTPATIENT)
Dept: NEUROLOGY | Facility: CLINIC | Age: 39
End: 2024-08-05
Payer: COMMERCIAL

## 2024-08-05 VITALS
BODY MASS INDEX: 42.88 KG/M2 | SYSTOLIC BLOOD PRESSURE: 130 MMHG | WEIGHT: 282 LBS | DIASTOLIC BLOOD PRESSURE: 84 MMHG | OXYGEN SATURATION: 98 %

## 2024-08-05 DIAGNOSIS — G44.209 TENSION HEADACHE: Primary | ICD-10-CM

## 2024-08-05 PROCEDURE — 99205 OFFICE O/P NEW HI 60 MIN: CPT | Performed by: PSYCHIATRY & NEUROLOGY

## 2024-08-05 RX ORDER — GABAPENTIN 300 MG/1
TABLET, FILM COATED ORAL
Qty: 90 TABLET | Refills: 3 | Status: SHIPPED | OUTPATIENT
Start: 2024-08-05

## 2024-08-05 NOTE — PROGRESS NOTES
CC: Numbness tingling mostly on the left arm    HPI:  Colin Basilio is a  39 y.o.  left-handed white male who was sent for a neurologic consultation by Kim Santos NP with complaints of approximate 6-month duration tingling in the left arm.  He describes neck pain radiating into the left shoulder and down into the left arm.  The tingling radiates right to the pinky.  He also describes some elbow pain over the lateral epicondyle.  He also has chronic tension headaches and pain at the base of the neck and skull.  No trigger points.  He states that occipital nerve blocks were tried but did not help.  He describes burning behind the ears.    In addition to this he describes left-sided sciatica.  He specifically denies any significant head or spine injuries meningitis seizure stroke or syncope.  Family history was negative for stroke, brain tumor brain hemorrhage or aneurysm of the brain artery.  The patient indicates no alcohol consumption and he smoked some in his 20s and quit.  He works as a .  He has had epidurals also to his neck which were not helpful.  He also describes waking with the left hand numb.    He had MRI of cervical spine 8/31/2023 showing straightening and some disc bulging at C6/7 without substantial foraminal stenosis or significant central canal stenosis.  He had lumbar spine x-rays 10/25/2023 which showed mild degenerative change at L3/4.        Past Medical History:   Diagnosis Date    Anxiety     Cervical disc disorder 9/2023    Recent MRI    Chronic pain disorder 2000    few years    Extremity pain 1/1/2022    left arm    GERD (gastroesophageal reflux disease)     Headache     Headache, tension-type 2000    few years, everyday    HLD (hyperlipidemia)     HTN (hypertension)     Low back pain 2001    center of back and left shoulder blade    Migraine     Neck pain 2000    Few years         Past Surgical History:   Procedure Laterality Date    CERVICAL EPIDURAL N/A  12/18/2023    Procedure: cervical epidural steroid injection at C6/7 93585;  Surgeon: Preston Toledo MD;  [place] [facility] [place];  Service: Pain Management;  Laterality: N/A;           Current Outpatient Medications:     amLODIPine (NORVASC) 10 MG tablet, Take 1 tablet by mouth Daily., Disp: 90 tablet, Rfl: 0    atorvastatin (LIPITOR) 40 MG tablet, Take 1 tablet by mouth Daily., Disp: 90 tablet, Rfl: 0    busPIRone (BUSPAR) 7.5 MG tablet, Take 1 tablet by mouth 3 (Three) Times a Day. for anxiety, Disp: 90 tablet, Rfl: 1    escitalopram (LEXAPRO) 10 MG tablet, Take 1 tablet by mouth Daily., Disp: 60 tablet, Rfl: 0    glycopyrrolate (ROBINUL) 2 MG tablet, Take 1 tablet by mouth 2 (Two) Times a Day., Disp: 90 tablet, Rfl: 0    lansoprazole (PREVACID) 30 MG capsule, Take 1 capsule by mouth Daily., Disp: 90 capsule, Rfl: 1    metoprolol succinate XL (TOPROL-XL) 50 MG 24 hr tablet, Take 1.5 tablets by mouth Daily., Disp: 90 tablet, Rfl: 0    celecoxib (CeleBREX) 100 MG capsule, Take 1 capsule by mouth 2 (Two) Times a Day As Needed for Mild Pain. (Patient not taking: Reported on 8/5/2024), Disp: 60 capsule, Rfl: 0      Family History   Problem Relation Age of Onset    Hypertension Mother     Diabetes Mother     Diabetes Father     Diabetes Sister     Diabetes Brother          Social History     Socioeconomic History    Marital status:    Tobacco Use    Smoking status: Never    Smokeless tobacco: Never   Vaping Use    Vaping status: Never Used   Substance and Sexual Activity    Alcohol use: Yes     Comment: very rarely    Drug use: Never    Sexual activity: Yes     Partners: Female         No Known Allergies      Pain Scale: 7/10        ROS:  Review of Systems   HENT:  Positive for tinnitus.    Musculoskeletal:  Positive for myalgias, neck pain and neck stiffness. Negative for arthralgias, back pain, gait problem and joint swelling.   Neurological:  Positive for weakness, light-headedness, numbness and  headaches. Negative for dizziness, tremors, seizures, syncope, facial asymmetry and speech difficulty.   Psychiatric/Behavioral:  Positive for agitation. Negative for behavioral problems, confusion, decreased concentration, dysphoric mood, hallucinations, self-injury, sleep disturbance and suicidal ideas. The patient is nervous/anxious. The patient is not hyperactive.        I have reviewed and agree with the above ROS completed by the medical assistant.      Physical Exam:  Vitals:    08/05/24 1343   Weight: 128 kg (282 lb)     Orthostatic BP:    Body mass index is 42.88 kg/m².    Physical Exam  General: M obese white male no acute distress  HEENT: Normocephalic no evidence of trauma.  Throat negative  Neck: Supple.  No thyromegaly.  No cervical bruits  Heart: Regular rate and rhythm no murmurs  Extremities: Radial pulses strong and simultaneous      Neurological Exam:   Mental Status: Awake, alert, oriented to person, place and time.  Conversant without evidence of an affective disorder, thought disorder, delusions or hallucinations.  Attention span and concentration are normal.  HCF: No aphasia, apraxia or dysarthria.  Recent and remote memory intact.  Knowledge of recent events intact.  CN: I:   II: Visual fields full without left inattention   III, IV, VI: Eye movements intact without nystagmus or ptosis.  Pupils equal  round and reactive to light.   V,VII: Light touch and pinprick intact all 3 divisions of V.  Facial muscles symmetrical.   VIII: Hearing intact to finger rub   IX,X: Soft palate elevates symmetrically   XI: Sternomastoid and trapezius are strong.   XII: Tongue midline without atrophy or fasciculations  Motor: Normal tone and bulk in the upper and lower extremities   Power testing: There is mild weakness of the interosseous muscles in the left hand.  All other muscles tested in the arms and legs were strong.  Reflexes: Upper extremities: +1 diffusely        Lower extremities: +1 diffusely         "Toe signs: +1 diffusely  Sensory: Light touch: Diffusely intact arms and legs        Pinprick: Diffusely intact arms and legs        Vibration: Intact at the ankles        Position: Intact at the great toes    Cerebellar: Finger-to-nose: Intact           Rapid movement: Intact           Heel-to-shin: Intact  Gait and Station: Normal casual toe heel and tandem walk.  No Romberg and no drift    Results:      Lab Results   Component Value Date    GLUCOSE 102 (H) 07/30/2024    BUN 13 07/30/2024    CREATININE 0.84 07/30/2024    BCR 15.5 07/30/2024    CO2 26.4 07/30/2024    CALCIUM 9.2 07/30/2024    PROTENTOTREF 6.9 01/29/2024    ALBUMIN 4.4 01/29/2024    LABIL2 1.8 01/29/2024    AST 37 01/29/2024    ALT 59 (H) 01/29/2024       Lab Results   Component Value Date    WBC 5.86 07/30/2024    HGB 14.3 07/30/2024    HCT 42.7 07/30/2024    MCV 84.7 07/30/2024     07/30/2024         .No results found for: \"RPR\"      Lab Results   Component Value Date    TSH 1.030 01/20/2023         No results found for: \"[id number]\"      No results found for: \"FOLATE\"      Lab Results   Component Value Date    HGBA1C 5.9 (H) 01/29/2024         Lab Results   Component Value Date    GLUCOSE 102 (H) 07/30/2024    BUN 13 07/30/2024    CREATININE 0.84 07/30/2024    BCR 15.5 07/30/2024    K 4.7 07/30/2024    CO2 26.4 07/30/2024    CALCIUM 9.2 07/30/2024    PROTENTOTREF 6.9 01/29/2024    ALBUMIN 4.4 01/29/2024    LABIL2 1.8 01/29/2024    AST 37 01/29/2024    ALT 59 (H) 01/29/2024         Lab Results   Component Value Date    WBC 5.86 07/30/2024    HGB 14.3 07/30/2024    HCT 42.7 07/30/2024    MCV 84.7 07/30/2024     07/30/2024             Assessment:   1.  Suspect left ulnar neuropathy at the elbow.  Rule out C8 radiculopathy MRI cervical spine 1 year ago did not demonstrate significant foraminal stenosis.  2.  Chronic neck pain with radicular symptoms but no other objective features for cervical radiculopathy.  3.  Left sided sciatica-no " objective findings.  Last year's plain film shows some DJD at L3/4.  4.  Tension type headaches        Plan:  1.  Trial of gabapentin up titrating to 300 mg 3 times daily  2.  Await EMG already scheduled  3.  Follow-up with Kenya Lau NP in about 2 months        Time: 60 minutes                  Dictated utilizing Dragon dictation.

## 2024-08-06 ENCOUNTER — PATIENT ROUNDING (BHMG ONLY) (OUTPATIENT)
Dept: NEUROLOGY | Facility: CLINIC | Age: 39
End: 2024-08-06
Payer: COMMERCIAL

## 2024-08-07 ENCOUNTER — TELEPHONE (OUTPATIENT)
Dept: NEUROLOGY | Facility: CLINIC | Age: 39
End: 2024-08-07
Payer: COMMERCIAL

## 2024-08-07 DIAGNOSIS — M54.12 CERVICAL RADICULOPATHY: Primary | ICD-10-CM

## 2024-08-07 DIAGNOSIS — G44.209 TENSION HEADACHE: ICD-10-CM

## 2024-08-07 RX ORDER — PREGABALIN 50 MG/1
50 CAPSULE ORAL 3 TIMES DAILY
Qty: 90 CAPSULE | Refills: 3 | Status: SHIPPED | OUTPATIENT
Start: 2024-08-07 | End: 2024-08-07

## 2024-08-07 RX ORDER — GABAPENTIN 300 MG/1
CAPSULE ORAL
Qty: 90 CAPSULE | Refills: 3 | Status: SHIPPED | OUTPATIENT
Start: 2024-08-07

## 2024-08-07 NOTE — TELEPHONE ENCOUNTER
I sent it in but the request is ridiculous. Both are immediate release and are interchangeable     [facility]

## 2024-08-07 NOTE — TELEPHONE ENCOUNTER
Received fax from pharmacy stating Gabapentin 300mg is not available in tablet form. They are requesting a new rx be sent in for capsules.

## 2024-08-28 ENCOUNTER — HOSPITAL ENCOUNTER (OUTPATIENT)
Dept: INFUSION THERAPY | Facility: HOSPITAL | Age: 39
Discharge: HOME | End: 2024-08-28
Admitting: PSYCHIATRY & NEUROLOGY
Payer: COMMERCIAL

## 2024-08-28 DIAGNOSIS — M54.12 CERVICAL RADICULOPATHY: ICD-10-CM

## 2024-08-28 PROCEDURE — 95886 MUSC TEST DONE W/N TEST COMP: CPT

## 2024-08-28 PROCEDURE — 95909 NRV CNDJ TST 5-6 STUDIES: CPT

## 2024-08-28 NOTE — PROCEDURES
.EMG and Nerve Conduction Studies    I.      Instrument used: Neuromax 1002  II.     Please see data sheets for tabular summary of NCS and details on methods, temperatures and lab standards.   III.    EMG muscles tested for upper extremity studies include the deltoid, biceps, triceps, pronator teres, extensor digitorum communis, first dorsal interosseous and abductor pollicis brevis.    IV.   EMG muscles tested for lower extremity studies include the vastus lateralis, tibialis anterior, peroneus longus, medial gastrocnemius and extensor digitorum brevis.    V.    Additional muscles tested as needed.  Paraspinal muscles tested as needed.   VI.   Please see data sheets for tabular summary of EMG findings.   VII. The complete report includes the data sheets.      Indication:NT LUE including face  History: 39-year-old male with numbness and tingling in the left arm which extends up the arm into the face.  Patient denies diabetes or thyroid disease.      Ht:68 inches  Wt: 290 #  HbA1C:   Lab Results   Component Value Date    HGBA1C 5.9 (H) 01/29/2024     TSH:   Lab Results   Component Value Date    TSH 1.030 01/20/2023       Technical summary:  Nerve conduction studies were obtained in the left arm with 1 comparison on the right.  Skin temperatures were at least 32 °C measured on the palms.  Needle examination was obtained on selected muscles in the left arm.    Results:  1.  Normal left median antidromic sensory distal latency and amplitude.  2.  Normal left ulnar antidromic sensory distal latency and amplitude.  3.  Normal left radial sensory distal latency and amplitude.  4.  Normal left median motor conduction velocity with a normal distal latency and amplitudes.  5.  Slow left ulnar motor conduction velocity in the short segment across the elbow at 48.4 m/s with normal velocity below the elbow.  Normal distal latency and amplitudes.  Normal absolute right ulnar motor conduction velocity in the short segment across  the elbow at 53.6 m/s with much faster velocity below the elbow at 64.7 m/s.  The difference of 11.1 m/s is a mild abnormality.  6.  Needle examination of selected muscles of the left arm showed normal insertional activities throughout with normal motor units and recruitment patterns throughout.    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  There was no evidence of a left median neuropathy or a left cervical radiculopathy by this study.  Study results were discussed with the patient.    Marcelo Grewal M.D.              Dictated utilizing Dragon dictation.

## 2024-08-28 NOTE — PROGRESS NOTES
Done. Wife aware   EMG shows ulnar neuropathies at the elbow on both sides. I recommend referral to a surgeon who specializes in this for evaluation. Please let me know if he would like me to place this referral. No evidence of carpal tunnel syndrome on the left, no evidence of his left arm symptoms coming from his neck.

## 2024-09-04 ENCOUNTER — OFFICE VISIT (OUTPATIENT)
Dept: PAIN MEDICINE | Facility: CLINIC | Age: 39
End: 2024-09-04
Payer: COMMERCIAL

## 2024-09-04 VITALS
TEMPERATURE: 96.6 F | BODY MASS INDEX: 43.44 KG/M2 | HEART RATE: 77 BPM | WEIGHT: 286.6 LBS | OXYGEN SATURATION: 96 % | HEIGHT: 68 IN | SYSTOLIC BLOOD PRESSURE: 142 MMHG | DIASTOLIC BLOOD PRESSURE: 100 MMHG

## 2024-09-04 DIAGNOSIS — R20.0 NUMBNESS AND TINGLING OF LEFT UPPER AND LOWER EXTREMITY: ICD-10-CM

## 2024-09-04 DIAGNOSIS — M54.42 CHRONIC LEFT-SIDED LOW BACK PAIN WITH LEFT-SIDED SCIATICA: ICD-10-CM

## 2024-09-04 DIAGNOSIS — R20.2 NUMBNESS AND TINGLING OF LEFT UPPER AND LOWER EXTREMITY: ICD-10-CM

## 2024-09-04 DIAGNOSIS — G56.23 ULNAR NEUROPATHY OF BOTH UPPER EXTREMITIES: Primary | ICD-10-CM

## 2024-09-04 DIAGNOSIS — G89.29 CHRONIC LEFT-SIDED LOW BACK PAIN WITH LEFT-SIDED SCIATICA: ICD-10-CM

## 2024-09-04 DIAGNOSIS — M54.2 NECK PAIN: ICD-10-CM

## 2024-09-04 PROCEDURE — 99213 OFFICE O/P EST LOW 20 MIN: CPT | Performed by: NURSE PRACTITIONER

## 2024-09-04 NOTE — PROGRESS NOTES
CHIEF COMPLAINT    Follow up left arm pain, neck pain. The patient is here for follow up after EMG.     Subjective   Colin Basilio is a 39 y.o. male  who presents for follow-up.  He has a history of left arm pain and neck pain.  At his last office visit Referral to PT was placed for his low back and left leg pain, also recommended proceeding with LUE EMG/NCS. He states that he has not started PT for his low back.     Today his pain is 7/10VAS in severity. His primary complaint remains numbness and tingling in the LUE, left face, and left occiput. He also reports intermittent numbness in his bilateral chest, around his eyes (L>R), and around his ears (L>R).     He is now on Gabapentin which is managed by Neurology.     Procedure list:  5/20/2024-bilateral occipital nerve blocks-minimal relief  12/18/2024-SALMA at C6/7-no relief    Past pain medications: OTC Tylenol, Ibuprofen, Salon Pas, Celebrex--no improvement     Current pain medications: None     Past therapies:  Physical Therapy: Yes, for neck pain, helped with stiffness but no improvement in neck pain and left arm symptoms.   Chiropractor: Yes, Once, many years ago  Massage Therapy: None  TENS: None  Neck or back surgery: None  Past pain management: None    Neck Pain   This is a chronic problem. The current episode started more than 1 year ago. The problem occurs constantly. The problem has been unchanged (remains unstable). The pain is present in the left side (radiating into the left upper extremity). The quality of the pain is described as burning and stabbing. The pain is at a severity of 7/10. Exacerbated by: working on a computer, position with sleep, ROM. The pain is Same all the time. Associated symptoms include headaches and weakness (left arm). He has tried heat and ice for the symptoms.   Back Pain  This is a chronic problem. The current episode started more than 1 month ago. The problem occurs intermittently. The problem has been worse since  onset. The pain is present in the lumbar spine. The quality of the pain is described as shooting. The pain radiates to the left thigh and left foot. The pain is at a severity of 7/10. The symptoms are aggravated by sitting. Associated symptoms include headaches and weakness (left arm). Pertinent negatives include no abdominal pain or dysuria. He has tried NSAIDs for the symptoms.      PEG Assessment   What number best describes your pain on average in the past week?8  What number best describes how, during the past week, pain has interfered with your enjoyment of life?9  What number best describes how, during the past week, pain has interfered with your general activity?  9    Review of Pertinent Medical Data ---    EMG/NCS performed on 8/28/2024  Technical summary:  Nerve conduction studies were obtained in the left arm with 1 comparison on the right.  Skin temperatures were at least 32 °C measured on the palms.  Needle examination was obtained on selected muscles in the left arm.     Results:  1.  Normal left median antidromic sensory distal latency and amplitude.  2.  Normal left ulnar antidromic sensory distal latency and amplitude.  3.  Normal left radial sensory distal latency and amplitude.  4.  Normal left median motor conduction velocity with a normal distal latency and amplitudes.  5.  Slow left ulnar motor conduction velocity in the short segment across the elbow at 48.4 m/s with normal velocity below the elbow.  Normal distal latency and amplitudes.  Normal absolute right ulnar motor conduction velocity in the short segment across the elbow at 53.6 m/s with much faster velocity below the elbow at 64.7 m/s.  The difference of 11.1 m/s is a mild abnormality.  6.  Needle examination of selected muscles of the left arm showed normal insertional activities throughout with normal motor units and recruitment patterns throughout.     Impression:  Abnormal study showing bilateral ulnar neuropathies at the elbows,  mild on the right and moderate on the left.  There was no evidence of a left median neuropathy or a left cervical radiculopathy by this study.  Study results were discussed with the patient.     Marcelo Grewal M.D.    Office visit from 8/5/2024 with Dr. Grewal reviewed.  Patient evaluated for neurological consultation with complaints of approximately 6 months duration of tingling in the left arm.  He has neck pain radiating into the left shoulder and down into the left arm.  He also has chronic tension headaches at the base of his neck and skull.  Additionally he describes left-sided sciatica.  Suspect left ulnar neuropathy at the elbow.  Rule out C8 radiculopathy, MRI cervical spine 1 year ago did not demonstrate significant foraminal stenosis.  Chronic neck pain with radicular symptoms but no other objective features for cervical radiculopathy.  Left-sided sciatica with no objective findings.  Tension type headaches.  Trial gabapentin 300 mg 3 times daily.  Await EMG already scheduled.  Follow-up with Kenya Lau NP in 2 months.    The following portions of the patient's history were reviewed and updated as appropriate: allergies, current medications, past family history, past medical history, past social history, past surgical history, and problem list.    Review of Systems   Constitutional:  Negative for chills.   Respiratory:  Negative for cough and shortness of breath.    Gastrointestinal:  Negative for abdominal pain, constipation and diarrhea.   Genitourinary:  Negative for difficulty urinating and dysuria.   Musculoskeletal:  Positive for back pain and neck pain.   Neurological:  Positive for weakness (left arm) and headaches. Negative for dizziness and light-headedness.   Psychiatric/Behavioral:  Negative for agitation.      --  The aforementioned information the Chief Complaint section and above subjective data including any HPI data, and also the Review of Systems data, has been personally reviewed and  "affirmed.  --    Vitals:    09/04/24 0943   BP: 142/100   BP Location: Left arm   Patient Position: Sitting   Pulse: 77   Temp: 96.6 °F (35.9 °C)   TempSrc: Temporal   SpO2: 96%   Weight: 130 kg (286 lb 9.6 oz)   Height: 172.7 cm (68\")   PainSc:   7   PainLoc: Neck     Objective   Physical Exam  Vitals and nursing note reviewed.   Constitutional:       Appearance: Normal appearance. He is well-developed.   Eyes:      General: Lids are normal.   Cardiovascular:      Rate and Rhythm: Normal rate.   Pulmonary:      Effort: Pulmonary effort is normal.   Neurological:      Mental Status: He is alert and oriented to person, place, and time.   Psychiatric:         Speech: Speech normal.         Behavior: Behavior normal.         Judgment: Judgment normal.         Assessment & Plan   Diagnoses and all orders for this visit:    1. Ulnar neuropathy of both upper extremities (Primary)  -     Ambulatory Referral to Hand Surgery    2. Numbness and tingling of left upper and lower extremity    3. Chronic left-sided low back pain with left-sided sciatica    4. Neck pain        Colin Basilio reports a pain score of 7.  Given his pain assessment as noted, treatment options were discussed and the following options were decided upon as a follow-up plan to address the patient's pain: referral to specialist for assistance in pain treatment guidance.    --- Refer to Dr. Swanson for evaluation of the ulnar neuropathies  --- I recommend he continue to work with medical neurology with regards to his diffuse numbness and tingling  --- Complete PT for the low back and LLE pain. If his back pain and LLE pain does not improve with PT then we will get a lumbar MRI and can consider injections.   --- Follow-up if pain fails to improve or worsens.      Dictated utilizing Dragon dictation.     "

## 2024-09-08 DIAGNOSIS — E78.5 HYPERLIPIDEMIA, UNSPECIFIED HYPERLIPIDEMIA TYPE: ICD-10-CM

## 2024-09-08 DIAGNOSIS — K21.9 GASTROESOPHAGEAL REFLUX DISEASE, UNSPECIFIED WHETHER ESOPHAGITIS PRESENT: ICD-10-CM

## 2024-09-09 RX ORDER — LANSOPRAZOLE 30 MG/1
30 CAPSULE, DELAYED RELEASE ORAL DAILY
Qty: 90 CAPSULE | Refills: 0 | Status: SHIPPED | OUTPATIENT
Start: 2024-09-09

## 2024-09-09 RX ORDER — ATORVASTATIN CALCIUM 40 MG/1
40 TABLET, FILM COATED ORAL DAILY
Qty: 90 TABLET | Refills: 0 | Status: SHIPPED | OUTPATIENT
Start: 2024-09-09

## 2024-09-25 NOTE — PROGRESS NOTES
NAME: Colin Basilio   : 1985    CC: Left-sided numbness and tingling and headaches       HPI:  Colin Basilio is a 39 y.o.  left-handed male who I am seeing for the first time in follow-up regarding numbness and tingling on the left side and headaches.  He has a past medical history of anxiety, GERD, hyperlipidemia and hypertension.   He was last seen by Dr. Grewal on 2024 and on 2024 for an EMG, with the following history taken from that note with additions/modifications as indicated:    Patient states that numbness and tingling on the left side started about 8 months ago.  He describes neck pain radiating into the left shoulder and down into the left arm.  The tingling radiates to the pinky.  He also describes some elbow pain over the lateral epicondyle.  He has had epidurals also to his neck which were not helpful.  He also describes waking with the left hand numb.  He has chronic tension headaches and pain at the base of the neck and skull.  No trigger points.  He states that occipital nerve blocks were tried but did not help.  He describes burning behind the ears.  In addition, he describes left-sided sciatica.      He specifically denies any significant head or spine injuries meningitis seizure stroke or syncope.  The patient indicates no alcohol consumption and he smoked some in his 20s and quit.  He works as a .       He had MRI of cervical spine 2023 showing straightening and some disc bulging at C6/7 without substantial foraminal stenosis or significant central canal stenosis.  He had lumbar spine x-rays 10/25/2023 which showed mild degenerative change at L3/4.  Dr. Grewal performed an EMG on 2024 with the following impression:    Abnormal study showing bilateral ulnar neuropathies at the elbows, mild on the right and moderate on the left. There was no evidence of a left median neuropathy or a left cervical radiculopathy by this study.     History  "interim    Patient reports numbness, tingling and bugs crawling sensation mostly on the left side but occasionally on the right side.  He reports left face and lips feeling numb but denies any trouble swallowing.  He states occasionally his left flank will feel numb.  He states that the bug crawling sensation can happen anywhere on his body but specifically states the head, face, back and feet.  He also describes a twitching sensation as if his \"nerves were shaky.\"  He describes pressure in his ears and states that his ears burned.  He states he has tinnitus and was seen by ENT who did not find anything.  He reports neck pain that radiates down the left arm.  He denies any incontinence of bowel or bladder.  He reports mid back pain as well as low back pain.  Reports occasional radicular pain into the left leg.    As for the headaches, he states that they are daily and can last several hours.  He describes a burning, tingle and pressure with occasional shooting pains that shoot up the back of his head.  He states it feels like a weight is sitting on top of his head.  He notices the headaches are worse when he is working and more mild when he is off work.  He drinks 2 cups of coffee daily.  He continues to take gabapentin 300 mg 3 times a day and denies any drowsiness or dizziness currently but reported mild drowsiness on the first day.  He states the gabapentin helps with the stabbing pains in his head but not really the burning, tingling, pressure or shooting pains.  He has not noticed any difference in the numbness, tingling or creepy crawling sensation on the left side after starting gabapentin.      Past Medical History:   Diagnosis Date    Anxiety     Cervical disc disorder 9/2023    Recent MRI    Chronic pain disorder 2000    few years    Extremity pain 1/1/2022    left arm    GERD (gastroesophageal reflux disease)     Headache     Headache, tension-type 2000    few years, everyday    HLD (hyperlipidemia)     " HTN (hypertension)     Low back pain 2001    center of back and left shoulder blade    Migraine     Neck pain 2000    Few years         Past Surgical History:   Procedure Laterality Date    CERVICAL EPIDURAL N/A 12/18/2023    Procedure: cervical epidural steroid injection at C6/7 47680;  Surgeon: Preston Toledo MD;  Location: [facility] MAIN OR;  Service: Pain Management;  Laterality: N/A;           Current Outpatient Medications:     amLODIPine (NORVASC) 10 MG tablet, Take 1 tablet by mouth Daily., Disp: 90 tablet, Rfl: 0    atorvastatin (LIPITOR) 40 MG tablet, Take 1 tablet by mouth once daily, Disp: 90 tablet, Rfl: 0    busPIRone (BUSPAR) 7.5 MG tablet, Take 1 tablet by mouth 3 (Three) Times a Day. for anxiety, Disp: 90 tablet, Rfl: 1    escitalopram (LEXAPRO) 10 MG tablet, Take 1 tablet by mouth Daily., Disp: 60 tablet, Rfl: 0    gabapentin (NEURONTIN) 300 MG capsule, Take 2 capsules by mouth 3 (Three) Times a Day., Disp: 180 capsule, Rfl: 2    glycopyrrolate (ROBINUL) 2 MG tablet, Take 1 tablet by mouth 2 (Two) Times a Day., Disp: 90 tablet, Rfl: 0    lansoprazole (PREVACID) 30 MG capsule, Take 1 capsule by mouth once daily, Disp: 90 capsule, Rfl: 0    metoprolol succinate XL (TOPROL-XL) 50 MG 24 hr tablet, Take 1.5 tablets by mouth Daily., Disp: 90 tablet, Rfl: 0    celecoxib (CeleBREX) 100 MG capsule, Take 1 capsule by mouth 2 (Two) Times a Day As Needed for Mild Pain. (Patient not taking: Reported on 8/5/2024), Disp: 60 capsule, Rfl: 0      Family History   Problem Relation Age of Onset    Hypertension Mother     Diabetes Mother     Diabetes Father     Diabetes Sister     Diabetes Brother          Social History     Socioeconomic History    Marital status:    Tobacco Use    Smoking status: Never    Smokeless tobacco: Never   Vaping Use    Vaping status: Never Used   Substance and Sexual Activity    Alcohol use: Yes     Comment: very rarely    Drug use: Never    Sexual activity: Yes     Partners: Female          No Known Allergies      Pain Scale: 7/10 left neck and left side        ROS:  Review of Systems   HENT:  Positive for tinnitus. Negative for trouble swallowing.    Eyes:  Negative for visual disturbance.   Musculoskeletal:  Positive for back pain, gait problem and neck pain.   Neurological:  Positive for weakness, numbness and headaches. Negative for dizziness, tremors, seizures, syncope, facial asymmetry, speech difficulty and light-headedness.   Psychiatric/Behavioral: Negative.         I have reviewed and agree with the above ROS completed by medical assistant.    Physical Exam:  Vitals:    24 0946   BP: 134/98   Pulse: 82   SpO2: 97%   Weight: 130 kg (286 lb)     Pain Score    24 0946   PainSc:   8        Orthostatic BP:       Physical Exam  General: M Obese white male no acute distress  HEENT: Normocephalic no evidence of trauma  Neck: Supple.  No thyromegaly.  No cervical bruits.  Heart: Regular rate and rhythm  Extremities: Radial pulses strong and simultaneous.  No pedal edema.      Neurological Exam:   Mental Status: Awake, alert, oriented to person, place and time.  Conversant without evidence of an affective disorder, thought disorder, delusions or hallucinations.  Attention span and concentration are normal.  HCF: No aphasia, apraxia or dysarthria.  Recent and remote memory intact.  Knowledge of recent events intact.  CN: I:   II: Visual fields full without left inattention   III, IV, VI: Eye movements intact without nystagmus or ptosis.  Pupils equal round and reactive to light.   V,VII: Light touch and pinprick intact all 3 divisions of V.  Facial muscles symmetrical.   VIII: Hearing intact to finger rub   IX,X: Soft palate elevates symmetrically   XI: Sternomastoid and trapezius are strong.   XII: Tongue midline without atrophy or fasciculations    Motor: Normal tone and bulk in the upper and lower extremities             Power testin/5      Right      Left  Deltoid 5/5 5/5    Biceps 5/5 5/5   Triceps 5/5 5/5   Wrist extensors 5/5 5/5   Finger extensors 5/5 5/5   Abductor pollicis brevis 5/5 5/5   Interosseous 5/5 4/5   Iliopsoas 5/5 5/5   Abductors 5/5 5/5   Adductors 5/5 5/5   Quadriceps 5/5 5/5   Hamstrings 5/5 5/5   Tibialis anterior 5/5 5/5   Toe extensors 5/5 5/5   Toe flexors 5/5 5/5     Reflexes:  R / L    Biceps +1 / +1   Triceps Trace   Brachioradialis +1 / +1   Patellar +2 / +2   Achilles +1 / +1   Plantar Downgoing   Clonus No clonus   Rothman's    Jaw jerk      Sensory: Light touch: Diffusely intact in the arms and leg        Pinprick: Diffusely intact in the arms and legs.  Diffusely intact down the back        Vibration: Intact at the ankles        Position: Intact at the great toes        Temperature: Diffusely intact on the back and left flank    Cerebellar: Finger-to-nose: Normal           Rapid movement: Normal           Heel-to-shin: Normal    Gait and Station: Comes to stand without difficult.  Normal casual toe heel and tandem walk.  No Romberg.  No drift.      Results:    Lab Results   Component Value Date    GLUCOSE 102 (H) 07/30/2024    BUN 13 07/30/2024    CREATININE 0.84 07/30/2024    BCR 15.5 07/30/2024    CO2 26.4 07/30/2024    CALCIUM 9.2 07/30/2024    PROTENTOTREF 6.9 01/29/2024    ALBUMIN 4.4 01/29/2024    LABIL2 1.8 01/29/2024    AST 37 01/29/2024    ALT 59 (H) 01/29/2024     Lab Results   Component Value Date    WBC 5.86 07/30/2024    HGB 14.3 07/30/2024    HCT 42.7 07/30/2024    MCV 84.7 07/30/2024     07/30/2024     Lab Results   Component Value Date    TSH 1.030 01/20/2023     Lab Results   Component Value Date    HGBA1C 5.9 (H) 01/29/2024     Lab Results   Component Value Date    HDL 37 (L) 07/30/2024     Lab Results   Component Value Date    LDL 79 07/30/2024     Lab Results   Component Value Date    TRIG 139 07/30/2024         Assessment/:    1.  Bilateral ulnar neuropathy at the elbow, mild on the right moderate on the left per EMG.   Discussed with patient wearing elbow splints and keeping elbow straight.  Most recent hemoglobin A1c 5.9% consistent with prediabetes.  Will go ahead and check other labs for treatable causes of neuropathy.  2.  Chronic neck pain with radicular symptoms but no objective features for cervical radiculopathy-patient describes left-sided neck pain, burning, numbness and tingling.  MRI cervical spine without contrast in 2023 demonstrated multilevel degenerative disease with a disc bulge at C6-C7 which mildly flattens the cord on the left as well as mild to moderate foraminal stenosis on the left.  Will check MRI cervical spine with and without contrast.  3.  Tension headaches vs cervicalgia-patient describes burning, tingling, pressure and shooting pains that shoot up to the top of his head.  Gabapentin 300 mg 3 times a day has helped with the stabbing pains in the head but not the other neuropathic symptoms.  Discussed escalating gabapentin 600 mg 3 times a day to see if it is more effective.  Patient denies any side effects.  Julio reviewed.  He also reports numbness and tingling in his lips and the left side of his face.  Will go ahead and check an MRI of the brain with and without contrast.  4.  Patient describes mid back pain with left flank numbness-will check MRI thoracic spine with and without contrast.  No upper motor neuron findings on exam.  Patient asked for all testing because his symptoms are getting worse and he is really concerned and would like to figure out what is going on.             Assessment and Plan   Diagnoses and all orders for this visit:    1. Paresthesia (Primary)  -     Vitamin B1, Whole Blood  -     Vitamin B12  -     Vitamin B2  -     Vitamin B6  -     DUSTIN by IFA, Reflex 9-biomarkers profile  -     CK  -     C-reactive Protein  -     Copper, Serum  -     Cryoglobulin  -     Heavy Metals, Blood  -     Immunofixation, Serum  -     Protein Elec + Interp, Serum  -     RPR Qualitative with  Reflex to Quant  -     Sedimentation Rate  -     TSH+Free T4  -     Folate  -     MRI Brain With & Without Contrast; Future  -     MRI Cervical Spine With & Without Contrast; Future  -     MRI Thoracic Spine With & Without Contrast; Future    2. Left facial numbness  -     MRI Brain With & Without Contrast; Future    3. Tension headache  -     MRI Brain With & Without Contrast; Future  -     gabapentin (NEURONTIN) 300 MG capsule; Take 2 capsules by mouth 3 (Three) Times a Day.  Dispense: 180 capsule; Refill: 2    4. Left sided numbness  -     MRI Cervical Spine With & Without Contrast; Future  -     MRI Thoracic Spine With & Without Contrast; Future    5. Midline thoracic back pain, unspecified chronicity  -     MRI Thoracic Spine With & Without Contrast; Future    6. Neck pain on left side  -     MRI Cervical Spine With & Without Contrast; Future    7. Cervical radiculopathy    8. Ulnar neuropathy at elbow of left upper extremity    9. Ulnar neuropathy at elbow of right upper extremity        Ideal targets for risk factor control would be Blood pressure < 130/80, B12 > 500, TSH in normal range and LDL < 70; HbA1c < 6.5 and smoking cessation if applicable. Call 911 for stroke symptoms.  Recommend 150 minutes of physical activity weekly.  Limit alcohol intake.  Follow-up after MRI or sooner if needed      Time: Spent 55 minutes in total encounter time. This included time for chart review, obtaining history, performing pertinent physical examination, updating medical information, ordering tests/referrals, discussion of diagnosis, medical management, counseling, and encounter documentation.        DEVONTE Sheridan          Much of this encounter note was dictated utilizing Dragon dictation which is an electronic transcription/translation of spoken language to printed text. The electronic translation of spoken language may permit erroneous, or at times, nonsensical words or phrases to be inadvertently transcribed;  Although I have reviewed the note for such errors, some may still exist

## 2024-09-30 ENCOUNTER — OFFICE VISIT (OUTPATIENT)
Dept: NEUROLOGY | Facility: CLINIC | Age: 39
End: 2024-09-30
Payer: COMMERCIAL

## 2024-09-30 VITALS
WEIGHT: 286 LBS | OXYGEN SATURATION: 97 % | SYSTOLIC BLOOD PRESSURE: 134 MMHG | HEART RATE: 82 BPM | DIASTOLIC BLOOD PRESSURE: 98 MMHG | BODY MASS INDEX: 43.49 KG/M2

## 2024-09-30 DIAGNOSIS — M54.12 CERVICAL RADICULOPATHY: ICD-10-CM

## 2024-09-30 DIAGNOSIS — M54.6 MIDLINE THORACIC BACK PAIN, UNSPECIFIED CHRONICITY: ICD-10-CM

## 2024-09-30 DIAGNOSIS — G44.209 TENSION HEADACHE: ICD-10-CM

## 2024-09-30 DIAGNOSIS — G56.21 ULNAR NEUROPATHY AT ELBOW OF RIGHT UPPER EXTREMITY: ICD-10-CM

## 2024-09-30 DIAGNOSIS — R20.0 LEFT FACIAL NUMBNESS: ICD-10-CM

## 2024-09-30 DIAGNOSIS — G56.22 ULNAR NEUROPATHY AT ELBOW OF LEFT UPPER EXTREMITY: ICD-10-CM

## 2024-09-30 DIAGNOSIS — R20.0 LEFT SIDED NUMBNESS: ICD-10-CM

## 2024-09-30 DIAGNOSIS — M54.2 NECK PAIN ON LEFT SIDE: ICD-10-CM

## 2024-09-30 DIAGNOSIS — R20.2 PARESTHESIA: Primary | ICD-10-CM

## 2024-09-30 PROCEDURE — 99215 OFFICE O/P EST HI 40 MIN: CPT

## 2024-09-30 RX ORDER — GABAPENTIN 300 MG/1
600 CAPSULE ORAL 3 TIMES DAILY
Qty: 180 CAPSULE | Refills: 2 | Status: SHIPPED | OUTPATIENT
Start: 2024-09-30

## 2024-10-05 LAB
ALBUMIN SERPL ELPH-MCNC: 4.1 G/DL (ref 2.9–4.4)
ALBUMIN/GLOB SERPL: 1.2 {RATIO} (ref 0.7–1.7)
ALPHA1 GLOB SERPL ELPH-MCNC: 0.3 G/DL (ref 0–0.4)
ALPHA2 GLOB SERPL ELPH-MCNC: 0.7 G/DL (ref 0.4–1)
ANA SER QL IF: NEGATIVE
ARSENIC BLD-MCNC: 3 UG/L (ref 0–9)
B-GLOBULIN SERPL ELPH-MCNC: 1.2 G/DL (ref 0.7–1.3)
CK SERPL-CCNC: 306 U/L (ref 49–439)
COPPER SERPL-MCNC: 92 UG/DL (ref 69–132)
CRP SERPL-MCNC: 2 MG/L (ref 0–10)
CRYOGLOB SER QL 1D COLD INC: NORMAL
ERYTHROCYTE [SEDIMENTATION RATE] IN BLOOD BY WESTERGREN METHOD: 7 MM/HR (ref 0–15)
FOLATE SERPL-MCNC: 18.1 NG/ML
GAMMA GLOB SERPL ELPH-MCNC: 1.1 G/DL (ref 0.4–1.8)
GLOBULIN SER CALC-MCNC: 3.3 G/DL (ref 2.2–3.9)
IGA SERPL-MCNC: 187 MG/DL (ref 90–386)
IGG SERPL-MCNC: 1091 MG/DL (ref 603–1613)
IGM SERPL-MCNC: 90 MG/DL (ref 20–172)
LABORATORY COMMENT REPORT: NORMAL
LABORATORY COMMENT REPORT: NORMAL
LEAD BLDV-MCNC: <1 UG/DL (ref 0–3.4)
M PROTEIN SERPL ELPH-MCNC: NORMAL G/DL
MERCURY BLD-MCNC: 1.6 UG/L (ref 0–14.9)
PROT PATTERN SERPL ELPH-IMP: NORMAL
PROT PATTERN SERPL IFE-IMP: NORMAL
PROT SERPL-MCNC: 7.4 G/DL (ref 6–8.5)
PYRIDOXAL PHOS SERPL-MCNC: 8.6 UG/L (ref 3.4–65.2)
RPR SER QL: NON REACTIVE
T4 FREE SERPL-MCNC: 1.23 NG/DL (ref 0.82–1.77)
TSH SERPL DL<=0.005 MIU/L-ACNC: 0.91 UIU/ML (ref 0.45–4.5)
VIT B1 BLD-SCNC: 112.3 NMOL/L (ref 66.5–200)
VIT B12 SERPL-MCNC: 647 PG/ML (ref 232–1245)
VIT B2 BLD-MCNC: 307 UG/L (ref 137–370)

## 2024-11-01 ENCOUNTER — TELEPHONE (OUTPATIENT)
Dept: NEUROLOGY | Facility: CLINIC | Age: 39
End: 2024-11-01
Payer: COMMERCIAL

## 2024-11-01 NOTE — TELEPHONE ENCOUNTER
----- Message from Kenya Lau sent at 11/1/2024  1:20 PM EDT -----  Can we see if neurosurgeries radiology tech can obtain the MRI cervical and thoracic images from [facility] and upload in [facility]?    Thanks

## 2024-11-05 ENCOUNTER — TELEPHONE (OUTPATIENT)
Dept: NEUROLOGY | Facility: CLINIC | Age: 39
End: 2024-11-05
Payer: COMMERCIAL

## 2024-11-05 DIAGNOSIS — R20.0 LEFT SIDED NUMBNESS: ICD-10-CM

## 2024-11-05 DIAGNOSIS — M54.12 CERVICAL RADICULOPATHY: ICD-10-CM

## 2024-11-05 DIAGNOSIS — M54.6 MIDLINE THORACIC BACK PAIN, UNSPECIFIED CHRONICITY: Primary | ICD-10-CM

## 2024-11-05 NOTE — TELEPHONE ENCOUNTER
Tried to call patient to review MRI results.  No answer, left voicemail to return call.  MRI brain was unremarkable.  MRI cervical spine shows paracentral disc herniation at C6/7 which could be contributing to left arm numbness and tingling.  MRI thoracic spine shows moderate to severe facet arthropathy from the mid to lower thoracic spine as well as degenerative disc disease.  I wanted to let patient know I have sent a referral to neurosurgery for evaluation of his neck and thoracic spine for further recommendations.

## 2024-11-06 NOTE — TELEPHONE ENCOUNTER
Called pt and relayed imaging results  Pt verbalized understanding and will await call from neurosurg

## 2024-11-06 NOTE — TELEPHONE ENCOUNTER
PATIENT RETURNING CALL FOR TEST RESULTS.  ATTEMPTED W/T LAYLA ADVISED TO SEND ENCOUNTER.    PLEASE RETURN CALL WITH RESULTS    THANK YOU

## 2024-11-08 DIAGNOSIS — R20.0 LEFT FACIAL NUMBNESS: ICD-10-CM

## 2024-11-08 DIAGNOSIS — G44.209 TENSION HEADACHE: ICD-10-CM

## 2024-11-08 DIAGNOSIS — R20.0 LEFT SIDED NUMBNESS: ICD-10-CM

## 2024-11-08 DIAGNOSIS — M54.6 MIDLINE THORACIC BACK PAIN, UNSPECIFIED CHRONICITY: ICD-10-CM

## 2024-11-08 DIAGNOSIS — R20.2 PARESTHESIA: ICD-10-CM

## 2024-11-08 DIAGNOSIS — M54.2 NECK PAIN ON LEFT SIDE: ICD-10-CM

## 2024-11-18 ENCOUNTER — TELEPHONE (OUTPATIENT)
Dept: NEUROSURGERY | Facility: CLINIC | Age: 39
End: 2024-11-18

## 2024-11-18 NOTE — TELEPHONE ENCOUNTER
Called and spoke to pt to reschedule today's appointment@1pm to 11/20/2024@2pm.  Pt voiced understanding.

## 2024-11-18 NOTE — PROGRESS NOTES
"Subjective   Patient ID: Colin Basilio is a 39 y.o. male is being seen for consultation today at the request of DEVONTE Sheridan    - Midline thoracic back pain, unspecified chronicity  - Cervical radiculopathy  - Left sided numbness   New patient   MRI cervical spine w/ wo contrast completed on 10/31/2024.    MRI thoracic spine w/ wo contrast completed on 10/31/2024.   MRI brain w/ wo contrast completed on 10/31/2024.       Patient reports neck pain radiating down left arm and left side of the body with numbness on L side of face and L arm.            History of Present Illness  This is a 39-year-old male who presents to my clinic with complaints of left face neck, arm, abdomen, leg symptoms.  He has baseline neck pain and tension headaches regularly.  He is reported that over the last few months he started developing a tingling sensation in his left arm as well as his left hemibody including his lower extremity and face.  This sensation has crept up from his neck to his lower face and now around his eye.  He says that this is a crawly electric buzzing sensation but is not particularly painful.  He has noticed that the left side of his face is love than the right and he feels that this is changed to symmetry of his facial features.  He does have episodes of dizziness and is recently developed tinnitus as well as a sensation of something \"loose in his head \" -he is seen an otolaryngologist and was told that his ears were normal.  He does have an episode of vertigo from many years ago but is not currently experiencing symptoms of vertigo he denies any seizure-like activity or history of seizures.      The following portions of the patient's history were reviewed and updated as appropriate: allergies, current medications, past family history, past medical history, past social history, past surgical history, and problem list.    Objective     Vitals:    11/20/24 1347   BP: 128/76   BP Location: Right arm " "  Patient Position: Sitting   Cuff Size: Adult   Pulse: 89   Resp: 16   Temp: 98.6 °F (37 °C)   TempSrc: Temporal   SpO2: 98%   Weight: 131 kg (288 lb)   Height: 172.7 cm (67.99\")   PainSc:   7   PainLoc: Neck     Body mass index is 43.8 kg/m².    Lab Results   Component Value Date    WBC 5.86 07/30/2024    HGB 14.3 07/30/2024    HCT 42.7 07/30/2024    MCV 84.7 07/30/2024     07/30/2024     Lab Results   Component Value Date    GLUCOSE 102 (H) 07/30/2024    CALCIUM 9.2 07/30/2024     07/30/2024    K 4.7 07/30/2024    CO2 26.4 07/30/2024     07/30/2024    BUN 13 07/30/2024    CREATININE 0.84 07/30/2024    EGFRRESULT 112.4 07/30/2024    BCR 15.5 07/30/2024       Physical Exam:    NAD  A&O3  Face symmetric, pupils equal round and reactive to light, EOMI  Motor:   5/5 BUE  5/5 BLE  Gait normal  Normal finger-to-nose  No ataxia  No nystagmus.  Facial sensation is intact bilaterally  No pronator drift      Assessment & Plan       Colin Basilio  reports that he has never smoked. He has never used smokeless tobacco.      Independent Review of Radiographic Studies:      I personally reviewed the images from the following studies.    MRI of the brain performed at [facility] on 9/30/2024:  This is an essentially normal MRI of the brain.  There is no Chiari malformation, he has a right dominant transverse sinus.  I do not see any obvious stenosis.  There are no intracranial lesions or evidence of previous stroke.  The brainstem looks normal in appearance.    MRI of the cervical spine performed at [facility] imaging 9/30/2024:  He has mild degenerative disc disease worse at C6-7 without significant foraminal stenosis.  No cord signal change no foraminal canal stenosis.    MRI of the thoracic spine performed 9/30/2024 at [facility] imaging:  He has degenerative disc disease and facet hypertrophy without significant canal or foraminal stenosis.  There were no abnormal signals within the thoracic spinal " cord    Medical Decision Making:      I spent 50 minutes caring for Colin on this date of service. This time includes time spent by me in the following activities:preparing for the visit, reviewing tests, obtaining and/or reviewing a separately obtained history, performing a medically appropriate examination and/or evaluation , counseling and educating the patient/family/caregiver, ordering medications, tests, or procedures, documenting information in the medical record, independently interpreting results and communicating that information with the patient/family/caregiver, and care coordination    Assessment & Plan  Paresthesia of left upper limb  EMG was performed by Marcelo Grewal which demonstrated left moderate and right mild ulnar neuropathy.  No evidence of cervical radiculopathy.  I reviewed his cervical MRI and do not believe that his mild degenerative disc at 6 7 C6-7 is contributing to his hemibody symptoms.    It is unclear what the correlation is between his bilateral tinnitus and this left hemibody tingling sensation.  He does say he has occasionally the sensation of bugs crawling or pinpricks over his face and his arm and leg.  He does not have any history of seizures but may want to consider EMG to rule out subclinical seizure activity.    The thoracic MRI also does not have any findings that would explain his symptoms.  Discussed all of this in detail with the patient and would recommend that he follow-up with his neurologist for further testing.         Return if symptoms worsen or fail to improve.     This note was completed using Dragon Dictation software.

## 2024-11-20 ENCOUNTER — OFFICE VISIT (OUTPATIENT)
Dept: NEUROSURGERY | Facility: CLINIC | Age: 39
End: 2024-11-20
Payer: COMMERCIAL

## 2024-11-20 VITALS
SYSTOLIC BLOOD PRESSURE: 128 MMHG | RESPIRATION RATE: 16 BRPM | TEMPERATURE: 98.6 F | OXYGEN SATURATION: 98 % | DIASTOLIC BLOOD PRESSURE: 76 MMHG | HEIGHT: 68 IN | BODY MASS INDEX: 43.65 KG/M2 | HEART RATE: 89 BPM | WEIGHT: 288 LBS

## 2024-11-20 DIAGNOSIS — R20.2 PARESTHESIA OF LEFT UPPER LIMB: Primary | ICD-10-CM

## 2024-11-20 PROBLEM — M54.12 CERVICAL RADICULAR PAIN: Status: RESOLVED | Noted: 2023-10-25 | Resolved: 2024-11-20

## 2024-11-20 NOTE — ASSESSMENT & PLAN NOTE
EMG was performed by Marcelo Grewal which demonstrated left moderate and right mild ulnar neuropathy.  No evidence of cervical radiculopathy.  I reviewed his cervical MRI and do not believe that his mild degenerative disc at 6 7 C6-7 is contributing to his hemibody symptoms.    It is unclear what the correlation is between his bilateral tinnitus and this left hemibody tingling sensation.  He does say he has occasionally the sensation of bugs crawling or pinpricks over his face and his arm and leg.  He does not have any history of seizures but may want to consider EMG to rule out subclinical seizure activity.    The thoracic MRI also does not have any findings that would explain his symptoms.  Discussed all of this in detail with the patient and would recommend that he follow-up with his neurologist for further testing.

## 2024-11-21 DIAGNOSIS — L74.519 PRIMARY FOCAL HYPERHIDROSIS: ICD-10-CM

## 2024-11-21 DIAGNOSIS — I10 BENIGN ESSENTIAL HYPERTENSION: ICD-10-CM

## 2024-11-21 DIAGNOSIS — F41.9 ANXIETY: ICD-10-CM

## 2024-11-21 RX ORDER — BUSPIRONE HYDROCHLORIDE 7.5 MG/1
7.5 TABLET ORAL 3 TIMES DAILY
Qty: 90 TABLET | Refills: 0 | Status: SHIPPED | OUTPATIENT
Start: 2024-11-21

## 2024-11-21 RX ORDER — AMLODIPINE BESYLATE 10 MG/1
10 TABLET ORAL DAILY
Qty: 90 TABLET | Refills: 0 | Status: SHIPPED | OUTPATIENT
Start: 2024-11-21

## 2024-11-21 RX ORDER — GLYCOPYRROLATE 2 MG/1
2 TABLET ORAL 2 TIMES DAILY
Qty: 90 TABLET | Refills: 0 | Status: SHIPPED | OUTPATIENT
Start: 2024-11-21

## 2024-12-02 ENCOUNTER — OFFICE VISIT (OUTPATIENT)
Dept: NEUROLOGY | Facility: CLINIC | Age: 39
End: 2024-12-02
Payer: COMMERCIAL

## 2024-12-02 VITALS
DIASTOLIC BLOOD PRESSURE: 88 MMHG | WEIGHT: 288 LBS | SYSTOLIC BLOOD PRESSURE: 128 MMHG | HEART RATE: 77 BPM | OXYGEN SATURATION: 98 % | BODY MASS INDEX: 43.65 KG/M2 | HEIGHT: 68 IN

## 2024-12-02 DIAGNOSIS — G56.22 ULNAR NEUROPATHY AT ELBOW OF LEFT UPPER EXTREMITY: Primary | ICD-10-CM

## 2024-12-02 DIAGNOSIS — R20.2 PARESTHESIA: ICD-10-CM

## 2024-12-02 DIAGNOSIS — M54.2 CHRONIC NECK AND BACK PAIN: ICD-10-CM

## 2024-12-02 DIAGNOSIS — R20.2 PARESTHESIA OF LEFT ARM AND LEG: ICD-10-CM

## 2024-12-02 DIAGNOSIS — G89.29 CHRONIC NECK AND BACK PAIN: ICD-10-CM

## 2024-12-02 DIAGNOSIS — G44.209 TENSION HEADACHE: ICD-10-CM

## 2024-12-02 DIAGNOSIS — G56.21 ULNAR NEUROPATHY AT ELBOW OF RIGHT UPPER EXTREMITY: ICD-10-CM

## 2024-12-02 DIAGNOSIS — M54.9 CHRONIC NECK AND BACK PAIN: ICD-10-CM

## 2024-12-02 PROCEDURE — 99215 OFFICE O/P EST HI 40 MIN: CPT

## 2024-12-02 RX ORDER — GABAPENTIN 300 MG/1
600 CAPSULE ORAL 3 TIMES DAILY
Qty: 180 CAPSULE | Refills: 5 | Status: SHIPPED | OUTPATIENT
Start: 2024-12-02

## 2024-12-02 NOTE — PROGRESS NOTES
"NAME: Colin Basilio   : 1985    Chief Complaint   Patient presents with    Numbness        HPI:  Colin Basilio is a 39 y.o. left-handed male who I am seeing in follow-up regarding paresthesias on the left side and daily headaches.  He has a past medical history of anxiety/GERD/hyperlipidemia and hypertension.   I last saw him on 2024, with the following history taken from that note with additions/modifications as indicated:      He describes neck pain radiating into the left shoulder and down into the left arm.  The tingling radiates to the pinky.  He also describes some elbow pain over the lateral epicondyle.  He has had epidurals also to his neck which were not helpful.  He also describes waking with the left hand numb.  Patient reports numbness, tingling and bugs crawling sensation mostly on the left side but occasionally on the right side that started about a year ago.  He reports left face and lips feeling numb but denies any trouble swallowing.  He states occasionally his left flank will feel numb.  He states that his chest feels like it is vibrating when he lies down.  He states that the bug crawling sensation can happen anywhere on his body but specifically states the head, face, back and feet.  He also describes a twitching sensation as if his \"nerves were shaky.\"  He describes pressure in his ears and states that his ears burned.  He states he has tinnitus and was seen by ENT who did not find anything. He denies any incontinence of bowel or bladder.  He reports mid back pain as well as low back pain.  Reports occasional left-sided sciatica.    He has chronic tension headaches and pain at the base of the neck and skull.  No trigger points.  He states that occipital nerve blocks were tried but did not help.  He describes burning behind the ears. He describes a burning, tingle and pressure with occasional shooting pains that shoot up the back of his head. He states it feels like a weight " is sitting on top of his head. He notices the headaches are worse when he is working and more mild when he is off work. He continues to take gabapentin 300 mg 3 times a day and denies any drowsiness or dizziness currently but reported mild drowsiness on the first day. He states the gabapentin helps with the stabbing pains in his head but not really the burning, tingling, pressure or shooting pains. He has not noticed any difference in the numbness, tingling or creepy crawling sensation on the left side after starting gabapentin.      He specifically denies any significant head or spine injuries meningitis seizure stroke or syncope.  The patient indicates no alcohol consumption and he smoked some in his 20s and quit.  He works as a .       He had MRI of cervical spine 8/31/2023 showing straightening and some disc bulging at C6/7 without substantial foraminal stenosis or significant central canal stenosis.  He had lumbar spine x-rays 10/25/2023 which showed mild degenerative change at L3/4.  Dr. Grewal performed an EMG on 8/28/2024 with the following impression:     Abnormal study showing bilateral ulnar neuropathies at the elbows, mild on the right and moderate on the left. There was no evidence of a left median neuropathy or a left cervical radiculopathy by this study.     History interim    Patient here today to follow-up on MRI brain, cervical and thoracic spine all completed on 9/30/2024.  Patient continues to report a tingling sensation in his left arm as well as left hemibody including his lower extremity and face.  He states his sensation has crept up from his neck to his lower face and now around his left eye.  He states that his left side of his face is love than the right and he feels this has changed his symmetry of his facial features.  He continues to have daily tension headaches that are worse when he is working.  He continues to take gabapentin 600 mg 3 times a day without any side  effects.  He states that it helps with the stabbing and shooting pains but not the tingling or crawling sensation.    Labs for treatable causes of neuropathy fairly unremarkable other than hemoglobin A1c of 5.9% consistent with prediabetes.  CRP 2, B1 112.3, B12 647, B2 307, B6 8.6, , sed rate 7, TSH 0.914, free T4 1.23, folate 18.1 all normal.  DUSTIN negative, heavy metals negative, cryoglobulin not detected, no MGUS, and RPR nonreactive.    MRI of the brain with and without contrast was unremarkable.  MRI cervical spine demonstrated mild DDD worst at C6-7 with moderate left-sided canal narrowing.  No cord signal change and mild foraminal stenosis.  MRI thoracic spine demonstrated DDD facet hypertrophy without significant canal or foraminal stenosis.  No cord signal change.  Patient followed up with neurosurgery who did not believe his degenerative disc at C6-7 is contributing to his hemibody symptoms.      Past Medical History:   Diagnosis Date    Anxiety     Cervical disc disorder 9/2023    Recent MRI    Chronic pain disorder 2000    few years    Extremity pain 1/1/2022    left arm    GERD (gastroesophageal reflux disease)     Headache     Headache, tension-type 2000    few years, everyday    HLD (hyperlipidemia)     HTN (hypertension)     Low back pain 2001    center of back and left shoulder blade    Migraine     Neck pain 2000    Few years         Past Surgical History:   Procedure Laterality Date    CERVICAL EPIDURAL N/A 12/18/2023    Procedure: cervical epidural steroid injection at C6/7 25603;  Surgeon: Preston Toledo MD;  Location: [facility] MAIN OR;  Service: Pain Management;  Laterality: N/A;           Current Outpatient Medications:     amLODIPine (NORVASC) 10 MG tablet, Take 1 tablet by mouth once daily, Disp: 90 tablet, Rfl: 0    atorvastatin (LIPITOR) 40 MG tablet, Take 1 tablet by mouth once daily, Disp: 90 tablet, Rfl: 0    busPIRone (BUSPAR) 7.5 MG tablet, TAKE 1 TABLET BY MOUTH THREE TIMES  DAILY FOR ANXIETY, Disp: 90 tablet, Rfl: 0    celecoxib (CeleBREX) 100 MG capsule, Take 1 capsule by mouth 2 (Two) Times a Day As Needed for Mild Pain., Disp: 60 capsule, Rfl: 0    escitalopram (LEXAPRO) 10 MG tablet, Take 1 tablet by mouth Daily., Disp: 60 tablet, Rfl: 0    gabapentin (NEURONTIN) 300 MG capsule, Take 2 capsules by mouth 3 (Three) Times a Day., Disp: 180 capsule, Rfl: 5    glycopyrrolate (ROBINUL) 2 MG tablet, Take 1 tablet by mouth twice daily, Disp: 90 tablet, Rfl: 0    lansoprazole (PREVACID) 30 MG capsule, Take 1 capsule by mouth once daily, Disp: 90 capsule, Rfl: 0    metoprolol succinate XL (TOPROL-XL) 50 MG 24 hr tablet, Take 1.5 tablets by mouth Daily., Disp: 90 tablet, Rfl: 0      Family History   Problem Relation Age of Onset    Hypertension Mother     Diabetes Mother     Diabetes Father     Diabetes Sister     Diabetes Brother          Social History     Socioeconomic History    Marital status:    Tobacco Use    Smoking status: Never    Smokeless tobacco: Never   Vaping Use    Vaping status: Never Used   Substance and Sexual Activity    Alcohol use: Yes     Comment: very rarely    Drug use: Never    Sexual activity: Yes     Partners: Female         No Known Allergies      Pain Scale: 6/10 neck        ROS:  Review of Systems   HENT:  Positive for tinnitus.    Eyes:  Negative for visual disturbance.   Musculoskeletal:  Positive for back pain and neck pain. Negative for gait problem.   Neurological:  Positive for weakness, light-headedness and headaches. Negative for dizziness, tremors, seizures, syncope, facial asymmetry, speech difficulty and numbness.   Psychiatric/Behavioral:  Negative for agitation, behavioral problems, confusion, decreased concentration, dysphoric mood, hallucinations, self-injury, sleep disturbance and suicidal ideas. The patient is not nervous/anxious and is not hyperactive.        I have reviewed and agree with the above ROS completed by medical  "assistant.    Physical Exam:  Vitals:    12/02/24 1245   BP: 128/88   Pulse: 77   SpO2: 98%   Weight: 131 kg (288 lb)   Height: 172.7 cm (67.99\")       Orthostatic BP:       Physical Exam  General: M obese white male no acute distress  HEENT: Normocephalic no evidence of trauma  Neck: Supple.    Heart: Regular rate and rhythm  Extremities: Radial pulses strong and simultaneous.        Neurological Exam:   Mental Status: Awake, alert, oriented to person, place and time.  Conversant without evidence of an affective disorder, thought disorder, delusions or hallucinations.  Attention span and concentration are normal.  HCF: No aphasia, apraxia or dysarthria.  Recent and remote memory intact.  Knowledge of recent events intact.  CN: I:   II: Visual fields full without left inattention   III, IV, VI: Eye movements intact without nystagmus or ptosis.  Pupils equal round and reactive to light.   V,VII: Light touch and pinprick intact all 3 divisions of V.  Facial muscles slightly asymmetrical left lower lip.   VIII: Hearing intact to finger rub   IX,X: Soft palate elevates symmetrically   XI: Sternomastoid and trapezius are strong.   XII: Tongue midline without atrophy or fasciculations    Motor: Normal tone and bulk in the upper and lower extremities.    Power testing: Mild weakness of the left interosseous but good strength in all other muscles tested in the arms and legs.    Reflexes: Upper extremities: +1 diffusely        Lower extremities: +1 diffusely     Sensory: Light touch:        Pinprick:        Vibration: Intact at the ankles        Position:        Temperature:    Cerebellar: Finger-to-nose: Normal           Rapid movement: Normal           Heel-to-shin:    Gait and Station: Normal casual toe heel and tandem walk.  No Romberg no drift      Results:    Lab Results   Component Value Date    GLUCOSE 102 (H) 07/30/2024    BUN 13 07/30/2024    CREATININE 0.84 07/30/2024    BCR 15.5 07/30/2024    CO2 26.4 07/30/2024 "    CALCIUM 9.2 07/30/2024    PROTENTOTREF 7.4 09/30/2024    ALBUMIN 4.1 09/30/2024    LABIL2 1.2 09/30/2024    AST 37 01/29/2024    ALT 59 (H) 01/29/2024     Lab Results   Component Value Date    WBC 5.86 07/30/2024    HGB 14.3 07/30/2024    HCT 42.7 07/30/2024    MCV 84.7 07/30/2024     07/30/2024     Lab Results   Component Value Date    RPR Non Reactive 09/30/2024     Lab Results   Component Value Date    TSH 0.914 09/30/2024     Lab Results   Component Value Date    CPGEAMKW00 647 09/30/2024     Lab Results   Component Value Date    FOLATE 18.1 09/30/2024     Lab Results   Component Value Date    HGBA1C 5.9 (H) 01/29/2024     Lab Results   Component Value Date    ARSENIC 3 09/30/2024     Lab Results   Component Value Date    MERCURY 1.6 09/30/2024     Lab Results   Component Value Date    COPPER 92 09/30/2024     Lab Results   Component Value Date    SEDRATE 7 09/30/2024         Assessment:    1.  Bilateral ulnar neuropathies at the elbow-mild on the right moderate on the left.  Labs for treatable causes of neuropathy fairly unremarkable other than hemoglobin A1c of 5.9% consistent with prediabetes.  CRP 2, B1 112.3, B12 647, B2 307, B6 8.6, , sed rate 7, TSH 0.914, free T4 1.23, folate 18.1 all normal.  DUSTIN negative, heavy metals negative, cryoglobulin not detected, no MGUS, and RPR nonreactive.  Discussed with patient checking small fiber neuropathy biopsy.  Patient in agreement.  Will start benefit verification form.  Discussed with patient that neuropathic symptoms can be exacerbated with stress and anxiety.  Patient states he has not really been taking his Lexapro and only takes his BuSpar if needed which is not often.  Patient would like referral to [facility] for second opinion on symptoms.  2.  Chronic neck and back pain-followed up with neurosurgery.  Neurosurgery did not think his mild MRI cervical findings were causing his symptoms.  3.  Tension headaches for cervicalgia-patient  states less shooting and stabbing pains on gabapentin 600 mg 3 times a day.  Denies any side effects.  Julio reviewed.  MRI brain unremarkable.           Assessment and Plan   Diagnoses and all orders for this visit:    1. Ulnar neuropathy at elbow of left upper extremity (Primary)    2. Ulnar neuropathy at elbow of right upper extremity    3. Tension headache  -     gabapentin (NEURONTIN) 300 MG capsule; Take 2 capsules by mouth 3 (Three) Times a Day.  Dispense: 180 capsule; Refill: 5    4. Paresthesia    5. Chronic neck and back pain    6. Paresthesia of left arm and leg  -     Ambulatory Referral to Neurology        Ideal targets for risk factor control would be Blood pressure < 130/80, B12 > 500, TSH in normal range and LDL < 70; HbA1c < 6.5 and smoking cessation if applicable. Call 911 for stroke symptoms.  Recommend 150 minutes of physical activity weekly.  Limit alcohol intake.  Follow-up in 3 months or sooner if needed      Time: Spent 60 minutes in total encounter time. This included time for chart review, obtaining history, performing pertinent physical examination, updating medical information, ordering tests/referrals, discussion of diagnosis, medical management, counseling, and encounter documentation.        DEVONTE Sheridan          Much of this encounter note was dictated utilizing Dragon dictation which is an electronic transcription/translation of spoken language to printed text. The electronic translation of spoken language may permit erroneous, or at times, nonsensical words or phrases to be inadvertently transcribed; Although I have reviewed the note for such errors, some may still exist

## 2025-01-10 ENCOUNTER — TELEPHONE (OUTPATIENT)
Dept: NEUROLOGY | Facility: CLINIC | Age: 40
End: 2025-01-10
Payer: COMMERCIAL

## 2025-02-07 ENCOUNTER — OFFICE VISIT (OUTPATIENT)
Dept: FAMILY MEDICINE CLINIC | Facility: CLINIC | Age: 40
End: 2025-02-07
Payer: COMMERCIAL

## 2025-02-07 VITALS
SYSTOLIC BLOOD PRESSURE: 118 MMHG | WEIGHT: 281.6 LBS | BODY MASS INDEX: 42.68 KG/M2 | OXYGEN SATURATION: 98 % | TEMPERATURE: 98.7 F | HEART RATE: 82 BPM | DIASTOLIC BLOOD PRESSURE: 74 MMHG | HEIGHT: 68 IN

## 2025-02-07 DIAGNOSIS — J20.9 ACUTE BRONCHITIS, UNSPECIFIED ORGANISM: Primary | ICD-10-CM

## 2025-02-07 PROCEDURE — 99213 OFFICE O/P EST LOW 20 MIN: CPT

## 2025-02-07 NOTE — PATIENT INSTRUCTIONS

## 2025-02-07 NOTE — PROGRESS NOTES
16-Jun-2018 06:50 Subjective   Colin Basilio is a 39 y.o. male.     Patient or patient representative verbalized consent for the use of Ambient Listening during the visit with  DEVONTE Dockery for chart documentation. 2/7/2025  17:53 EST    Chief Complaint   Patient presents with    Cough     More than 1 month       History of Present Illness  The patient is a 39-year-old male who presents for evaluation of a persistent cough.    Persistent Cough  His symptoms began approximately 1.5 months ago, initially presenting as mild sneezing and a cough that has since become chronic. The severity of the cough fluctuates, occasionally improving before worsening again. He describes an itchy throat and sporadic urges to cough, which can range from mild to severe, often resulting in chest discomfort. The cough is predominantly dry, although it was initially accompanied by rhinorrhea. He has not undergone any testing for COVID-19 or influenza. He reports no recent changes in medication, exposure to sick individuals, respiratory distress, or congestion. His symptoms have remained consistent over the past 1.5 months. He also reports no new onset of nausea, vomiting, or diarrhea. He has no history of bronchitis. He has been self-medicating with NyQuil, DayQuil, and cough drops.  - Onset: Approximately 1.5 months ago.  - Location: Throat and chest.  - Duration: Persistent for 1.5 months.  - Character: Initially mild sneezing and cough, now chronic; itchy throat; sporadic urges to cough; predominantly dry cough; initially accompanied by rhinorrhea.  - Alleviating/Aggravating Factors: Self-medicating with NyQuil, DayQuil, and cough drops.  - Timing: Symptoms have remained consistent over the past 1.5 months.  - Severity: Fluctuates, ranging from mild to severe, often resulting in chest discomfort.    Supplemental Information  He has tinnitus, but nothing new.    MEDICATIONS  - Current: NyQuil, DayQuil       The following portions of the patient's  history were reviewed and updated as appropriate: allergies, current medications, past family history, past medical history, past social history, past surgical history and problem list.    Results         Objective     Vitals:    02/07/25 1734   BP: 118/74   Pulse: 82   Temp: 98.7 °F (37.1 °C)   SpO2: 98%      Body mass index is 42.83 kg/m².    Physical Exam  Vitals reviewed.   Constitutional:       Appearance: Normal appearance. He is obese.   HENT:      Right Ear: Tympanic membrane, ear canal and external ear normal.      Left Ear: Tympanic membrane, ear canal and external ear normal.      Nose: Nose normal.      Right Sinus: No maxillary sinus tenderness or frontal sinus tenderness.      Left Sinus: No maxillary sinus tenderness or frontal sinus tenderness.      Mouth/Throat:      Mouth: Mucous membranes are moist.      Pharynx: Oropharynx is clear. No oropharyngeal exudate or posterior oropharyngeal erythema.   Eyes:      Conjunctiva/sclera: Conjunctivae normal.   Cardiovascular:      Rate and Rhythm: Normal rate and regular rhythm.   Pulmonary:      Effort: Pulmonary effort is normal.      Breath sounds: Normal breath sounds.   Lymphadenopathy:      Cervical: No cervical adenopathy.   Skin:     General: Skin is warm and dry.   Neurological:      Mental Status: He is alert and oriented to person, place, and time.   Psychiatric:         Behavior: Behavior normal.         Assessment & Plan  1. Bronchitis  - Vital signs within normal limits, no evidence of pneumonia  - Absence of fever is a positive sign  - Treat as a bacterial infection due to symptom duration  - Continue with DayQuil and NyQuil if they provide relief  - Use a humidifier in the room  - Consume teas with honey to alleviate throat itchiness  - Antibiotic prescription to be sent to [facility] on [address]  - Complete the entire course of antibiotics  - Contact via MyChart if no improvement after completing the antibiotic course       Discussed  Care Gaps, ordered referrals and encouraged vaccination updates.       - Pt agrees with plan of care and denies further questions/concerns today  - This document is intended for medical expert use only. Persons  reading this document without medical staff guidance may result in misinterpretation and unintended morbidity     Go to the ER for any possible life-threatening symptoms such as chest pain or shortness of air.      Please allow 3-5 business days for recommendations based on new results      I personally spent time with this patient, preparing for the visit, reviewing tests, obtaining and/or reviewing a separately obtained history, performing a medically appropriate examination and/or evaluation, counseling and educating the patient/family/caregiver, ordering medications,  documenting information in the medical record and indepentently interpreting results.

## 2025-03-03 ENCOUNTER — OFFICE VISIT (OUTPATIENT)
Dept: NEUROLOGY | Facility: CLINIC | Age: 40
End: 2025-03-03
Payer: COMMERCIAL

## 2025-03-03 VITALS
HEIGHT: 68 IN | SYSTOLIC BLOOD PRESSURE: 124 MMHG | DIASTOLIC BLOOD PRESSURE: 84 MMHG | WEIGHT: 297 LBS | BODY MASS INDEX: 45.01 KG/M2 | OXYGEN SATURATION: 95 % | HEART RATE: 68 BPM

## 2025-03-03 DIAGNOSIS — G44.209 TENSION HEADACHE: ICD-10-CM

## 2025-03-03 DIAGNOSIS — R20.2 PARESTHESIA OF LEFT ARM AND LEG: ICD-10-CM

## 2025-03-03 DIAGNOSIS — G89.29 CHRONIC NECK AND BACK PAIN: ICD-10-CM

## 2025-03-03 DIAGNOSIS — M54.2 CHRONIC NECK AND BACK PAIN: ICD-10-CM

## 2025-03-03 DIAGNOSIS — G56.21 ULNAR NEUROPATHY AT ELBOW OF RIGHT UPPER EXTREMITY: ICD-10-CM

## 2025-03-03 DIAGNOSIS — M54.9 CHRONIC NECK AND BACK PAIN: ICD-10-CM

## 2025-03-03 DIAGNOSIS — G56.22 ULNAR NEUROPATHY AT ELBOW OF LEFT UPPER EXTREMITY: Primary | ICD-10-CM

## 2025-03-03 PROCEDURE — 99215 OFFICE O/P EST HI 40 MIN: CPT

## 2025-03-03 NOTE — PROGRESS NOTES
"NAME: Colin Basilio   : 1985    Chief Complaint   Patient presents with    Ulnar neuropathy at elbow of left upper extremity        HPI:  Colin Basilio is a 39 y.o.  left-handed male who I am seeing in follow-up regarding left hemibody paresthesia and tension headaches.  He has a past medical history of anxiety, GERD, prediabetes, hyperlipidemia and hypertension.  I last saw him on 2024, with the following history taken from that note with additions/modifications as indicated:    He describes neck pain radiating into the left shoulder and down into the left arm.  The tingling radiates to the pinky.  He also describes some elbow pain over the lateral epicondyle.  He has had epidurals also to his neck which were not helpful.  He also describes waking with the left hand numb.  Patient reports numbness, tingling and bugs crawling sensation mostly on the left side but occasionally on the right side that started about a year ago.  He reports left face and lips feeling numb but denies any trouble swallowing.  He states occasionally his left flank will feel numb.  He states that his chest feels like it is vibrating when he lies down.  He states that the bug crawling sensation can happen anywhere on his body but specifically states the head, face, back and feet.  He also describes a twitching sensation as if his \"nerves were shaky.\"  He describes pressure in his ears and states that his ears burned.  He states he has tinnitus and was seen by ENT who did not find anything. He denies any incontinence of bowel or bladder.  He reports mid back pain as well as low back pain.  Reports occasional left-sided sciatica.     He has chronic tension headaches and pain at the base of the neck and skull.  No trigger points.  He states that occipital nerve blocks were tried but did not help.  He describes burning behind the ears. He describes a burning, tingle and pressure with occasional shooting pains that shoot up the " back of his head. He states it feels like a weight is sitting on top of his head. He notices the headaches are worse when he is working and more mild when he is off work. He continues to take gabapentin 300 mg 3 times a day and denies any drowsiness or dizziness currently but reported mild drowsiness on the first day. He states the gabapentin helps with the stabbing pains in his head but not really the burning, tingling, pressure or shooting pains. He has not noticed any difference in the numbness, tingling or creepy crawling sensation on the left side after starting gabapentin.     Labs for treatable causes of neuropathy fairly unremarkable other than hemoglobin A1c of 5.9% consistent with prediabetes.  CRP 2, B1 112.3, B12 647, B2 307, B6 8.6, , sed rate 7, TSH 0.914, free T4 1.23, folate 18.1 all normal.  DUSTIN negative, heavy metals negative, cryoglobulin not detected, no MGUS, and RPR nonreactive.      He had MRI of cervical spine 8/31/2023 showing straightening and some disc bulging at C6/7 without substantial foraminal stenosis or significant central canal stenosis.  He had lumbar spine x-rays 10/25/2023 which showed mild degenerative change at L3/4.  Dr. Grewal performed an EMG on 8/28/2024 with the following impression:     Abnormal study showing bilateral ulnar neuropathies at the elbows, mild on the right and moderate on the left. There was no evidence of a left median neuropathy or a left cervical radiculopathy by this study.     History interim    Patient states that his symptoms may be slightly worse.  Patient states he continues to have creepy crawly sensation on the left side of his body.  He states more recently he has noticed the sensation around the front of his neck and it radiates down the left arm.  He feels like his left arm and leg are weaker along with his neck.  He states occasionally he feels like he is on a roller coaster and he has a feeling of falling backwards when he is sitting  down.  He denies any dizziness.  He describes a vibrating sensation inside his chest and occasionally on his face but no visible tremors.  He continues to report low back pain that occasionally radiates into the left leg.  He denies any incontinence of bowel or bladder.  He continues to take gabapentin 600 mg 3 times a day without any side effects.  He states the gabapentin helps with this shooting stabbing pains but not with tingling and creepy crawly sensation.    He continues to report daily tension headaches.  He does not feel like gabapentin helps headaches any.  Of note, he heard from [facility] last Thursday regarding scheduling an appointment.  Patient states he is still considering but is concerned about cost.    Past Medical History:   Diagnosis Date    Anxiety     Arthritis     Cervical disc disorder 9/2023    Recent MRI    Chronic pain disorder 2000    few years    Extremity pain 1/1/2022    left arm    GERD (gastroesophageal reflux disease)     Headache     Headache, tension-type 2000    few years, everyday    HLD (hyperlipidemia)     HTN (hypertension)     Low back pain 2001    center of back and left shoulder blade    Migraine     Neck pain 2000    Few years         Past Surgical History:   Procedure Laterality Date    CERVICAL EPIDURAL N/A 12/18/2023    Procedure: cervical epidural steroid injection at C6/7 58119;  Surgeon: Preston Toledo MD;  Location: [facility] [place];  Service: Pain Management;  Laterality: N/A;           Current Outpatient Medications:     amLODIPine (NORVASC) 10 MG tablet, Take 1 tablet by mouth once daily, Disp: 90 tablet, Rfl: 0    atorvastatin (LIPITOR) 40 MG tablet, Take 1 tablet by mouth once daily, Disp: 90 tablet, Rfl: 0    celecoxib (CeleBREX) 100 MG capsule, Take 1 capsule by mouth 2 (Two) Times a Day As Needed for Mild Pain., Disp: 60 capsule, Rfl: 0    gabapentin (NEURONTIN) 300 MG capsule, Take 2 capsules by mouth 3 (Three) Times a Day., Disp: 180 capsule, Rfl: 5     "glycopyrrolate (ROBINUL) 2 MG tablet, Take 1 tablet by mouth twice daily, Disp: 90 tablet, Rfl: 0    lansoprazole (PREVACID) 30 MG capsule, Take 1 capsule by mouth once daily, Disp: 90 capsule, Rfl: 0    metoprolol succinate XL (TOPROL-XL) 50 MG 24 hr tablet, Take 1.5 tablets by mouth Daily., Disp: 90 tablet, Rfl: 0    amitriptyline (ELAVIL) 25 MG tablet, Take 1 tablet by mouth Every Night., Disp: 30 tablet, Rfl: 2      Family History   Problem Relation Age of Onset    Hypertension Mother     Diabetes Mother     Diabetes Father     Diabetes Sister     Diabetes Brother          Social History     Socioeconomic History    Marital status:    Tobacco Use    Smoking status: Never     Passive exposure: Never    Smokeless tobacco: Never   Vaping Use    Vaping status: Never Used   Substance and Sexual Activity    Alcohol use: Yes     Comment: very rarely    Drug use: Never    Sexual activity: Yes     Partners: Female         No Known Allergies      Pain Scale: 6/10 neck        ROS:  Review of Systems   HENT:  Positive for tinnitus.    Musculoskeletal:  Positive for back pain and neck pain. Negative for gait problem.   Neurological:  Positive for weakness and numbness (Tingling). Negative for dizziness.       Physical Exam:  Vitals:    03/03/25 1125   BP: 124/84   Pulse: 68   SpO2: 95%   Weight: 135 kg (297 lb)   Height: 172.7 cm (67.99\")       Orthostatic BP:       Physical Exam  General: M obese white male no acute distress  HEENT: Normocephalic no evidence of trauma  Neck: Supple.  Negative Lhermitte sign  Heart: Regular rate and rhythm  Extremities: Radial pulses strong and simultaneous.  .      Neurological Exam:   Mental Status: Awake, alert, oriented to person, place and time.  Conversant without evidence of an affective disorder, thought disorder, delusions or hallucinations.  Attention span and concentration are normal.  HCF: No aphasia, apraxia or dysarthria.  Recent and remote memory intact.  Knowledge of " recent events intact.  CN: I:   II: Visual fields full without left inattention   III, IV, VI: Eye movements intact without nystagmus or ptosis.  Pupils equal round and reactive to light.   V,VII: Light touch and pinprick intact all 3 divisions of V.  Facial muscles symmetrical.   VIII: Hearing intact to finger rub   IX,X: Soft palate elevates symmetrically   XI: Sternomastoid and trapezius are strong.   XII: Tongue midline without atrophy or fasciculations    Motor: Normal tone and bulk in the upper and lower extremities.    Power testing: Mild weakness of left interosseous but good strength in all other muscles tested in the arms and legs    Reflexes: Upper extremities: +1 diffusely        Lower extremities: +1 diffusely        Toe signs:        Clonus:     Sensory: Light touch: Diffusely intact in the arms and legs        Pinprick: Diffusely intact in the arms and legs but patient states a little sharper on the right leg compared to the left        Vibration: Intact at the ankles        Position:        Temperature:    Cerebellar: Finger-to-nose: Normal           Rapid movement: Normal           Heel-to-shin:    Gait and Station: Comes to stand without difficulty.  Normal casual toe heel and tandem walk.  No Romberg no drift.      Results:    Lab Results   Component Value Date    GLUCOSE 102 (H) 07/30/2024    BUN 13 07/30/2024    CREATININE 0.84 07/30/2024    BCR 15.5 07/30/2024    CO2 26.4 07/30/2024    CALCIUM 9.2 07/30/2024    ALBUMIN 4.1 09/30/2024    AST 37 01/29/2024    ALT 59 (H) 01/29/2024     Lab Results   Component Value Date    WBC 5.86 07/30/2024    HGB 14.3 07/30/2024    HCT 42.7 07/30/2024    MCV 84.7 07/30/2024     07/30/2024     Lab Results   Component Value Date    RPR Non Reactive 09/30/2024     Lab Results   Component Value Date    TSH 0.914 09/30/2024     Lab Results   Component Value Date    QNQYBEIJ34 647 09/30/2024     Lab Results   Component Value Date    FOLATE 18.1 09/30/2024      Lab Results   Component Value Date    HGBA1C 5.9 (H) 01/29/2024     Lab Results   Component Value Date    ARSENIC 3 09/30/2024     Lab Results   Component Value Date    MERCURY 1.6 09/30/2024     Lab Results   Component Value Date    COPPER 92 09/30/2024     Lab Results   Component Value Date    SEDRATE 7 09/30/2024         Assessment:    1.  Bilateral ulnar neuropathies at the elbow-mild on the right, moderate on the left.  Patient is going to schedule skin biopsy for small fiber neuropathy.  He is still deciding if he would like to schedule his appointment at [facility] for second opinion.  2.  Chronic neck and back pain-follows with neurosurgery  3.  Tension headaches-no change.  Less shooting and stabbing but no change in creepy crawly sensation on gabapentin 600 mg 3 times a day.           Assessment and Plan   Diagnoses and all orders for this visit:    1. Ulnar neuropathy at elbow of left upper extremity (Primary)    2. Ulnar neuropathy at elbow of right upper extremity    3. Tension headache  -     amitriptyline (ELAVIL) 25 MG tablet; Take 1 tablet by mouth Every Night.  Dispense: 30 tablet; Refill: 2    4. Chronic neck and back pain    5. Paresthesia of left arm and leg  -     amitriptyline (ELAVIL) 25 MG tablet; Take 1 tablet by mouth Every Night.  Dispense: 30 tablet; Refill: 2        Ideal targets for risk factor control would be Blood pressure < 130/80, B12 > 500, TSH in normal range and LDL < 70; HbA1c < 6.5 and smoking cessation if applicable. Call 911 for stroke symptoms.  Recommend 150 minutes of physical activity weekly.  Limit alcohol intake.  Continue gabapentin 600 mg 3 times a day.  Julio reviewed.  Side effects reviewed.  Trial amitriptyline 25 mg nightly.  Side effects reviewed.  Schedule intraepidermal nerve fiber density skin biopsy  Follow-up at time of skin biopsy or sooner if needed      Time: Spent 50 minutes in total encounter time. This included time for chart review,  obtaining history, performing pertinent physical examination, updating medical information, ordering tests/referrals, discussion of diagnosis, medical management, counseling, and encounter documentation.        DEVONTE Sheridan          Much of this encounter note was dictated utilizing Dragon dictation which is an electronic transcription/translation of spoken language to printed text. The electronic translation of spoken language may permit erroneous, or at times, nonsensical words or phrases to be inadvertently transcribed; Although I have reviewed the note for such errors, some may still exist.    Portions of this assessment have been copied from previous documentation which has been thoroughly reviewed and updated as appropriate.

## 2025-03-17 ENCOUNTER — PROCEDURE VISIT (OUTPATIENT)
Dept: NEUROLOGY | Facility: CLINIC | Age: 40
End: 2025-03-17
Payer: COMMERCIAL

## 2025-03-17 DIAGNOSIS — R20.2 PARESTHESIA OF LEFT ARM AND LEG: Primary | ICD-10-CM

## 2025-03-17 NOTE — PATIENT INSTRUCTIONS
CARING FOR YOUR BIOPSY SITE    One or more of your biopsy sites may continue to bleed for the rest of the day.  If bleeding persists after 48 hours, you should continue to keep the biopsy site dry and refrain from soaking.  Each biopsy site may or may not form a scab in a few days.  While the biopsy sites heal, make sure to:    Always keep the biopsy areas clean and dry.  Do not soak in water for 48 hours: No swimming, no hot tubs, no baths.  Showers are permitted within the first 24 hours, but avoid direct, prolonged water contact with the biopsy sites.  Change the bandages whenever they become wet or damp.  We recommend that you apply Aquaphor or Vaseline to the Band-Aid.  Keep the wound covered with Aquaphor or Vaseline at all times for 2 weeks.  For each biopsy site, a bandage is no longer necessary if: A scab has formed or new skin begins to grow over the biopsy sites and the bleeding has stopped.      1 or more biopsy sites may form a small bump or a small indent, and could form a small scar.  But in most cases, after a few months pass, you will not be able to notice from where the biopsies were taken.      ADDITIONAL INFORMATION:    Your biopsy site should not be particularly painful.  You can apply ice to the area or take Tylenol for discomfort.  Do not take Motrin, ibuprofen, or aspirin.  A significant increase in pain may indicate a problem.  Call the office if this occurs.      WHEN TO CALL YOUR CLINICIAN WHO PERFORMED THE BIOPSY:    Redness around 1 or more of the biopsy sites that has spread significantly.  There is discharge coming from the biopsy site.  You have a fever of 100 °F or higher.  Swelling  If bleeding occurs which you cannot stop with firm pressure for 20 minutes      WHO TO CALL:    Call [facility] Office at [phone] for problems.  Ask for your physician and inform the staff if you have a problem with your biopsy site.

## 2025-03-17 NOTE — PROGRESS NOTES
"Skin Biopsy Procedure Note    PRE-OP DIAGNOSIS: Left arm and leg paresthesia  POST-OP DIAGNOSIS: Same     PROCEDURE: punch skin biopsy    Performing Provider: DEVONTE Ellison    Reason for Biopsy/Brief Clinical History: Colin Basilio is a  39 y.o.  male who is here for intraepidermal nerve fiber density skin biopsy.  He describes waking with the left hand numb. Patient reports numbness, tingling and bugs crawling sensation mostly on the left side but occasionally on the right side that started about a year ago. He reports left face and lips feeling numb but denies any trouble swallowing. He states occasionally his left flank will feel numb. He states that his chest feels like it is vibrating when he lies down. He states that the bug crawling sensation can happen anywhere on his body but specifically states the head, face, back and feet. He also describes a twitching sensation as if his \"nerves were shaky.\"     Labs for treatable causes of neuropathy fairly unremarkable other than hemoglobin A1c of 5.9% consistent with prediabetes. CRP 2, B1 112.3, B12 647, B2 307, B6 8.6, , sed rate 7, TSH 0.914, free T4 1.23, folate 18.1 all normal. DUSTIN negative, heavy metals negative, cryoglobulin not detected, no MGUS, and RPR nonreactive.     Follow-up in 6 to 8 weeks for results.      Biopsy site:      Distal Thigh (Left)  10 cm above lateral knee    Distal Leg (Left)  10 cm above the lateral malleolus       After obtaining informed consent, the area was prepped and draped in the usual fashion.     Timeout was performed including correct patient, date of birth, allergies and biopsy locations.    Anesthesia was obtained with 2% lidocaine with epinephrine.     A full thickness punch biopsy was obtained at each site with a 3mm punch. Gauze and bandage were applied and hemostasis was assured. The patient tolerated the procedure well.    Wound care discussed and handout with home instructions given to patient.    2 " Specimen(s) were placed in each vial of fixative and packaged appropriately to be sent for processing at [facility].      [facility] Fed-ex pick-up number  - [id number]    [employer]Ex Tracking - [id number]      Kit Number- [id number]  Expiration Date- 09/06/2025

## 2025-03-23 DIAGNOSIS — K21.9 GASTROESOPHAGEAL REFLUX DISEASE, UNSPECIFIED WHETHER ESOPHAGITIS PRESENT: ICD-10-CM

## 2025-03-23 RX ORDER — LANSOPRAZOLE 30 MG/1
30 CAPSULE, DELAYED RELEASE ORAL DAILY
Qty: 90 CAPSULE | Refills: 0 | Status: SHIPPED | OUTPATIENT
Start: 2025-03-23

## 2025-04-21 NOTE — PROGRESS NOTES
"NAME: Colin Basilio   : 1985    Chief Complaint   Patient presents with    Ulnar Neuropathy     Headache    Chronic Neck & Back Pain     Numbness     Left arm & leg        HPI:  Colin Basilio is a 40 y.o.  left-handed male who I am seeing in follow-up regarding small fiber neuropathy and tension headaches.  He has a past medical history of anxiety, GERD, prediabetes, hyperlipidemia and hypertension.  I last saw him on 3/3/2025 and on 3/17/2025 for a skin biopsy, with the following history taken from that note with additions/modifications as indicated:    He describes neck pain radiating into the left shoulder and down into the left arm.  The tingling radiates to the pinky.  He also describes some elbow pain over the lateral epicondyle.  He has had epidurals also to his neck which were not helpful.  He also describes waking with the left hand numb.  Patient reports numbness, tingling and bugs crawling sensation mostly on the left side but occasionally on the right side that started about a year ago.  He reports left face and lips feeling numb but denies any trouble swallowing.  He states occasionally his left flank will feel numb.  He states that his chest feels like it is vibrating when he lies down.  He states that the bug crawling sensation can happen anywhere on his body but specifically states the head, face, back and feet.  He also describes a twitching sensation as if his \"nerves were shaky.\"  He describes pressure in his ears and states that his ears burned.  He states he has tinnitus and was seen by ENT who did not find anything. He denies any incontinence of bowel or bladder.  He reports mid back pain as well as low back pain.  Reports occasional left-sided sciatica.     He has chronic tension headaches and pain at the base of the neck and skull.  No trigger points.  He states that occipital nerve blocks were tried but did not help.  He describes burning behind the ears. He describes a " burning, tingle and pressure with occasional shooting pains that shoot up the back of his head. He states it feels like a weight is sitting on top of his head. He notices the headaches are worse when he is working and more mild when he is off work. He continues to take gabapentin 300 mg 3 times a day and denies any drowsiness or dizziness currently but reported mild drowsiness on the first day. He states the gabapentin helps with the stabbing pains in his head but not really the burning, tingling, pressure or shooting pains. He has not noticed any difference in the numbness, tingling or creepy crawling sensation on the left side after starting gabapentin.      Labs for treatable causes of neuropathy fairly unremarkable other than hemoglobin A1c of 5.9% consistent with prediabetes.  CRP 2, B1 112.3, B12 647, B2 307, B6 8.6, , sed rate 7, TSH 0.914, free T4 1.23, folate 18.1 all normal.  DUSTIN negative, heavy metals negative, cryoglobulin not detected, no MGUS, and RPR nonreactive.      He had MRI of cervical spine 8/31/2023 showing straightening and some disc bulging at C6/7 without substantial foraminal stenosis or significant central canal stenosis.  He had lumbar spine x-rays 10/25/2023 which showed mild degenerative change at L3/4.  Dr. Grewal performed an EMG on 8/28/2024 with the following impression:     Abnormal study showing bilateral ulnar neuropathies at the elbows, mild on the right and moderate on the left. There was no evidence of a left median neuropathy or a left cervical radiculopathy by this study.    History interim    Patient here today to follow-up on skin biopsy results which demonstrated reduced intraepidermal nerve fiber densities in both biopsy sites, consistent with small fiber neuropathy.    The patient states he was mowing the grass 2 weeks ago and felt pressure in the back of his head and neck.  He states he felt dizzy and off balance.  He states he went inside to lie down and felt  "like he was on a boat.  He denied any nausea or vomiting.  He denied any headache at the time but reported burning in the back of his head.  He reports feeling a tight band around his head often.  He states that turning his head too fast can also make him dizzy.  He states he followed up with ENT who did the [place] and it was negative so he is scheduled later this week for [facility].  He states he had vertigo 8 years ago and was treated with prednisone.  He continues to report burning in his leg.  He reports occasional internal shakes but denies physically shaking.  He continues to take gabapentin 600 mg 3 times a day but sometimes only takes it twice a day.  He states it makes him a little drowsy.  He stopped taking amitriptyline because he said it made him feel like a \"zombie.\"    Of note, patient states he is scheduled to follow-up with [facility] on 6/23/2025.      Past Medical History:   Diagnosis Date    Anxiety     Arthritis     Cervical disc disorder 9/2023    Recent MRI    Chronic pain disorder 2000    few years    Extremity pain 1/1/2022    left arm    GERD (gastroesophageal reflux disease)     Headache     Headache, tension-type 2000    few years, everyday    HLD (hyperlipidemia)     HTN (hypertension)     Low back pain 2001    center of back and left shoulder blade    Migraine     Neck pain 2000    Few years         Past Surgical History:   Procedure Laterality Date    CERVICAL EPIDURAL N/A 12/18/2023    Procedure: cervical epidural steroid injection at C6/7 19915;  Surgeon: Preston Toledo MD;  Location: [facility] [place] MAIN OR;  Service: Pain Management;  Laterality: N/A;           Current Outpatient Medications:     amLODIPine (NORVASC) 10 MG tablet, Take 1 tablet by mouth once daily, Disp: 90 tablet, Rfl: 0    atorvastatin (LIPITOR) 40 MG tablet, Take 1 tablet by mouth once daily, Disp: 90 tablet, Rfl: 0    gabapentin (NEURONTIN) 300 MG capsule, Take 2 capsules by mouth 3 (Three) Times a Day., Disp: " "180 capsule, Rfl: 5    glycopyrrolate (ROBINUL) 2 MG tablet, Take 1 tablet by mouth twice daily, Disp: 90 tablet, Rfl: 0    lansoprazole (PREVACID) 30 MG capsule, Take 1 capsule by mouth once daily, Disp: 90 capsule, Rfl: 0    metoprolol succinate XL (TOPROL-XL) 50 MG 24 hr tablet, Take 1.5 tablets by mouth Daily., Disp: 90 tablet, Rfl: 0    celecoxib (CeleBREX) 100 MG capsule, Take 1 capsule by mouth 2 (Two) Times a Day As Needed for Mild Pain. (Patient not taking: Reported on 4/22/2025), Disp: 60 capsule, Rfl: 0    DULoxetine (CYMBALTA) 30 MG capsule, Take 1 capsule by mouth Daily., Disp: 30 capsule, Rfl: 2      Family History   Problem Relation Age of Onset    Hypertension Mother     Diabetes Mother     Diabetes Father     Diabetes Sister     Diabetes Brother          Social History     Socioeconomic History    Marital status:    Tobacco Use    Smoking status: Never     Passive exposure: Never    Smokeless tobacco: Never   Vaping Use    Vaping status: Never Used   Substance and Sexual Activity    Alcohol use: Yes     Comment: very rarely    Drug use: Never    Sexual activity: Yes     Partners: Female         No Known Allergies      Pain Scale: 6/10 legs burning        ROS:  Review of Systems   Musculoskeletal:  Positive for back pain, gait problem and neck pain.   Neurological:  Positive for dizziness, tremors, weakness, numbness and headaches.       Physical Exam:  Vitals:    04/22/25 1132   BP: 122/78   Pulse: 73   SpO2: 97%   Weight: 128 kg (283 lb)   Height: 172.7 cm (67.99\")       Orthostatic BP:       Physical Exam  General: M obese White male no acute distress  HEENT: Normocephalic no evidence of trauma.  No tenderness on palpation.  Neck: Supple.    Heart: Regular rate and rhythm  Extremities: Radial pulses strong and simultaneous.        Neurological Exam:   Mental Status: Awake, alert, oriented to person, place and time.  Conversant without evidence of an affective disorder, thought disorder, " delusions or hallucinations.  Attention span and concentration are normal.  HCF: No aphasia, apraxia or dysarthria.  Recent and remote memory intact.  Knowledge of recent events intact.  CN: I:   II: Visual fields full without left inattention   III, IV, VI: Eye movements intact without nystagmus or ptosis.  Pupils equal round and reactive to light.   V,VII: Light touch and pinprick intact all 3 divisions of V.  Facial muscles symmetrical.   VIII: Hearing intact to finger rub   IX,X: Soft palate elevates symmetrically   XI: Sternomastoid and trapezius are strong.   XII: Tongue midline without atrophy or fasciculations    Motor: Normal tone and bulk in the upper and lower extremities.    Power testing: Pretty good strength in all muscles tested in the arms and legs.    Reflexes: Upper extremities: Trace diffusely        Lower extremities: Trace diffusely        Toe signs:        Clonus:     Sensory: Light touch:        Pinprick:        Vibration:        Position:        Temperature:    Cerebellar: Finger-to-nose: Normal.  Very minimal postural tremor           Rapid movement: Normal           Heel-to-shin:    Gait and Station: Comes to stand without difficulty.  Normal casual toe heel and tandem walk.  No Romberg no drift.      Results:    Lab Results   Component Value Date    GLUCOSE 102 (H) 07/30/2024    BUN 13 07/30/2024    CREATININE 0.84 07/30/2024    BCR 15.5 07/30/2024    CO2 26.4 07/30/2024    CALCIUM 9.2 07/30/2024    ALBUMIN 4.1 09/30/2024    AST 37 01/29/2024    ALT 59 (H) 01/29/2024     Lab Results   Component Value Date    WBC 5.86 07/30/2024    HGB 14.3 07/30/2024    HCT 42.7 07/30/2024    MCV 84.7 07/30/2024     07/30/2024     Lab Results   Component Value Date    RPR Non Reactive 09/30/2024     Lab Results   Component Value Date    TSH 0.914 09/30/2024     Lab Results   Component Value Date    [id number] 647 09/30/2024     Lab Results   Component Value Date    FOLATE 18.1 09/30/2024     Lab  Results   Component Value Date    HGBA1C 5.9 (H) 01/29/2024     Lab Results   Component Value Date    ARSENIC 3 09/30/2024     Lab Results   Component Value Date    MERCURY 1.6 09/30/2024     Lab Results   Component Value Date    COPPER 92 09/30/2024     Lab Results   Component Value Date    SEDRATE 7 09/30/2024         Assessment:    1.  Small fiber neuropathy-skin biopsy demonstrated reduced intraepidermal nerve fiber densities in both biopsy sites.  No amyloidosis on [place] red stain.  Somewhat controlled on gabapentin.  Patient unable to tolerate amitriptyline.  Discussed trialing pregabalin or Cymbalta.  2.  Bilateral ulnar neuropathies at the elbow  3.  Tension headaches  4.  Dizziness-suspect some autonomic involvement with small fiber neuropathy but will check CTA head and neck to rule out vertebrobasilar insufficiency.  5.  Chronic neck and back pain           Assessment and Plan   Diagnoses and all orders for this visit:    1. Idiopathic small fiber sensory neuropathy (Primary)  -     DULoxetine (CYMBALTA) 30 MG capsule; Take 1 capsule by mouth Daily.  Dispense: 30 capsule; Refill: 2    2. Dizziness  -     CT Angiogram Head; Future  -     CT Angiogram Neck With & Without Contrast; Future    3. Ulnar neuropathy at elbow of left upper extremity    4. Ulnar neuropathy at elbow of right upper extremity    5. Tension headache    6. Chronic neck and back pain    7. Paresthesia of left arm and leg        Ideal targets for risk factor control would be Blood pressure < 130/80, B12 > 500, TSH in normal range and LDL < 70; HbA1c < 6.5 and smoking cessation if applicable. Call 911 for stroke symptoms.  Recommend 150 minutes of physical activity weekly.  Limit alcohol intake.  Patient to remain off work until appointment with [facility] on 6/23/2025.  CTA head and neck to rule out vertebrobasilar insufficiency  Continue gabapentin 600 mg 3 times a day.  Julio reviewed.  Side effects reviewed.  Trial Cymbalta 30  mg daily.  Side effects reviewed.  Discontinue amitriptyline  Follow-up in 8 weeks or sooner if needed      Time: Spent 60 minutes in total encounter time. This included time for chart review, obtaining history, performing pertinent physical examination, updating medical information, ordering tests/referrals, discussion of diagnosis, medical management, counseling, and encounter documentation.        DEVONTE Sheridan          Much of this encounter note was dictated utilizing Dragon dictation which is an electronic transcription/translation of spoken language to printed text. The electronic translation of spoken language may permit erroneous, or at times, nonsensical words or phrases to be inadvertently transcribed; Although I have reviewed the note for such errors, some may still exist.    Portions of this assessment have been copied from previous documentation which has been thoroughly reviewed and updated as appropriate.

## 2025-04-22 ENCOUNTER — OFFICE VISIT (OUTPATIENT)
Dept: NEUROLOGY | Facility: CLINIC | Age: 40
End: 2025-04-22
Payer: COMMERCIAL

## 2025-04-22 VITALS
OXYGEN SATURATION: 97 % | DIASTOLIC BLOOD PRESSURE: 78 MMHG | HEIGHT: 68 IN | BODY MASS INDEX: 42.89 KG/M2 | WEIGHT: 283 LBS | SYSTOLIC BLOOD PRESSURE: 122 MMHG | HEART RATE: 73 BPM

## 2025-04-22 DIAGNOSIS — R42 DIZZINESS: ICD-10-CM

## 2025-04-22 DIAGNOSIS — M54.9 CHRONIC NECK AND BACK PAIN: ICD-10-CM

## 2025-04-22 DIAGNOSIS — G56.21 ULNAR NEUROPATHY AT ELBOW OF RIGHT UPPER EXTREMITY: ICD-10-CM

## 2025-04-22 DIAGNOSIS — G44.209 TENSION HEADACHE: ICD-10-CM

## 2025-04-22 DIAGNOSIS — G56.22 ULNAR NEUROPATHY AT ELBOW OF LEFT UPPER EXTREMITY: ICD-10-CM

## 2025-04-22 DIAGNOSIS — R20.2 PARESTHESIA OF LEFT ARM AND LEG: ICD-10-CM

## 2025-04-22 DIAGNOSIS — M54.2 CHRONIC NECK AND BACK PAIN: ICD-10-CM

## 2025-04-22 DIAGNOSIS — G60.8 IDIOPATHIC SMALL FIBER SENSORY NEUROPATHY: Primary | ICD-10-CM

## 2025-04-22 DIAGNOSIS — G89.29 CHRONIC NECK AND BACK PAIN: ICD-10-CM

## 2025-04-22 PROCEDURE — 99215 OFFICE O/P EST HI 40 MIN: CPT

## 2025-04-22 RX ORDER — DULOXETIN HYDROCHLORIDE 30 MG/1
30 CAPSULE, DELAYED RELEASE ORAL DAILY
Qty: 30 CAPSULE | Refills: 2 | Status: SHIPPED | OUTPATIENT
Start: 2025-04-22

## 2025-04-24 ENCOUNTER — TELEPHONE (OUTPATIENT)
Dept: NEUROLOGY | Facility: CLINIC | Age: 40
End: 2025-04-24

## 2025-05-16 DIAGNOSIS — G44.209 TENSION HEADACHE: ICD-10-CM

## 2025-05-16 RX ORDER — GABAPENTIN 300 MG/1
600 CAPSULE ORAL 3 TIMES DAILY
Qty: 180 CAPSULE | Refills: 5 | Status: SHIPPED | OUTPATIENT
Start: 2025-05-16

## 2025-06-03 DIAGNOSIS — R42 DIZZINESS: ICD-10-CM

## 2025-06-16 RX ORDER — AMITRIPTYLINE HYDROCHLORIDE 10 MG/1
10 TABLET ORAL NIGHTLY
Qty: 30 TABLET | Refills: 2 | Status: SHIPPED | OUTPATIENT
Start: 2025-06-16

## 2025-06-18 ENCOUNTER — TELEPHONE (OUTPATIENT)
Dept: NEUROLOGY | Facility: CLINIC | Age: 40
End: 2025-06-18

## 2025-06-18 NOTE — TELEPHONE ENCOUNTER
Caller: Colin Basilio    Relationship: Self    Best call back number: [phone]    What form or medical record are you requesting: LETTER    Who is requesting this form or medical record from you: WORK    How would you like to receive the form or medical records (pick-up, mail, fax): [facility]    Timeframe paperwork needed: ASAP    Additional notes: WOULD LIKE A NOTE STATING HE IS OKAY TO RETURN TO WORK ON 6/25/25. PLEASE REVIEW, THANK YOU.

## 2025-06-20 NOTE — PROGRESS NOTES
"NAME: Colin Basilio   : 1985    Chief Complaint   Patient presents with    Idopathic small fiber sensory neuropathy        HPI:  Colin Basilio is a 40 y.o. left-handed male who I am seeing in follow-up regarding small fiber neuropathy and tension headaches.  He has a past medical history of anxiety, GERD, prediabetes, hyperlipidemia and hypertension.  I last saw him on 2025, with the following history taken from that note with additions/modifications as indicated:    He describes neck pain radiating into the left shoulder and down into the left arm.  The tingling radiates to the pinky.  He also describes some elbow pain over the lateral epicondyle.  He has had epidurals also to his neck which were not helpful.  He also describes waking with the left hand numb.  Patient reports numbness, tingling and bugs crawling sensation mostly on the left side but occasionally on the right side that started about a year ago.  He reports left face and lips feeling numb but denies any trouble swallowing.  He states occasionally his left flank will feel numb.  He states that his chest feels like it is vibrating when he lies down.  He states that the bug crawling sensation can happen anywhere on his body but specifically states the head, face, back and feet.  He also describes a twitching sensation as if his \"nerves were shaky.\"  He describes pressure in his ears and states that his ears burned.  He states he has tinnitus and was seen by ENT who did not find anything. He denies any incontinence of bowel or bladder.  He reports mid back pain as well as low back pain.  Reports occasional left-sided sciatica.     He has chronic tension headaches and pain at the base of the neck and skull.  No trigger points.  He states that occipital nerve blocks were tried but did not help.  He describes burning behind the ears. He describes a burning, tingle and pressure with occasional shooting pains that shoot up the back of his " head. He states it feels like a weight is sitting on top of his head. He notices the headaches are worse when he is working and more mild when he is off work. He continues to take gabapentin 300 mg 3 times a day and denies any drowsiness or dizziness currently but reported mild drowsiness on the first day. He states the gabapentin helps with the stabbing pains in his head but not really the burning, tingling, pressure or shooting pains. He has not noticed any difference in the numbness, tingling or creepy crawling sensation on the left side after starting gabapentin.      Labs for treatable causes of neuropathy fairly unremarkable other than hemoglobin A1c of 5.9% consistent with prediabetes.  CRP 2, B1 112.3, B12 647, B2 307, B6 8.6, , sed rate 7, TSH 0.914, free T4 1.23, folate 18.1 all normal.  DUSTIN negative, heavy metals negative, cryoglobulin not detected, no MGUS, and RPR nonreactive.  Skin biopsy results demonstrated reduced intraepidermal nerve fiber densities in both biopsy sites, consistent with small fiber neuropathy.      He had MRI of cervical spine 8/31/2023 showing straightening and some disc bulging at C6/7 without substantial foraminal stenosis or significant central canal stenosis.  He had lumbar spine x-rays 10/25/2023 which showed mild degenerative change at L3/4.  Dr. Grewal performed an EMG on 8/28/2024 with the following impression:     Abnormal study showing bilateral ulnar neuropathies at the elbows, mild on the right and moderate on the left. There was no evidence of a left median neuropathy or a left cervical radiculopathy by this study.    History interim    Patient states that his symptoms are about the same but he has noticed the feeling of falling is better since being more active.  He denies any recent falls.  CTA head and neck was unremarkable.  He continues to report a weird sensation as if bugs are crawling on him but states it is not as bad with the gabapentin and  amitriptyline.  He just started amitriptyline 10 mg last night and states he does not feel as tired today as he did with the 25 mg.  He discontinued Cymbalta due to sexual side effects.  He denies any change in his headaches or neck pain and states the severity varies day-to-day.    Patient followed up with [facility] last week who recommended physical therapy, losing weight and referral to vascular surgery for pulse volume recording (PVR) for thoracic outlet syndrome.  Official note has not been signed but patient instructions in AVS were available for me to view.      Past Medical History:   Diagnosis Date    Anxiety     Arthritis     Cervical disc disorder 9/2023    Recent MRI    Chronic pain disorder 2000    few years    Extremity pain 1/1/2022    left arm    GERD (gastroesophageal reflux disease)     Headache     Headache, tension-type 2000    few years, everyday    HLD (hyperlipidemia)     HTN (hypertension)     Low back pain 2001    center of back and left shoulder blade    Migraine     Neck pain 2000    Few years         Past Surgical History:   Procedure Laterality Date    CERVICAL EPIDURAL N/A 12/18/2023    Procedure: cervical epidural steroid injection at C6/7 37390;  Surgeon: Preston Toledo MD;  [place] [facility] [place] [facility] [place];  Service: Pain Management;  Laterality: N/A;           Current Outpatient Medications:     amitriptyline (ELAVIL) 10 MG tablet, Take 1 tablet by mouth Every Night., Disp: 30 tablet, Rfl: 2    amLODIPine (NORVASC) 10 MG tablet, Take 1 tablet by mouth once daily, Disp: 90 tablet, Rfl: 0    atorvastatin (LIPITOR) 40 MG tablet, Take 1 tablet by mouth once daily, Disp: 90 tablet, Rfl: 0    gabapentin (NEURONTIN) 300 MG capsule, Take 2 capsules by mouth 3 (Three) Times a Day., Disp: 180 capsule, Rfl: 5    glycopyrrolate (ROBINUL) 2 MG tablet, Take 1 tablet by mouth twice daily, Disp: 90 tablet, Rfl: 0    lansoprazole (PREVACID) 30 MG capsule, Take 1 capsule by mouth once daily, Disp:  90 capsule, Rfl: 0    metoprolol succinate XL (TOPROL-XL) 50 MG 24 hr tablet, Take 1.5 tablets by mouth Daily., Disp: 90 tablet, Rfl: 0    celecoxib (CeleBREX) 100 MG capsule, Take 1 capsule by mouth 2 (Two) Times a Day As Needed for Mild Pain. (Patient not taking: Reported on 4/22/2025), Disp: 60 capsule, Rfl: 0      Family History   Problem Relation Age of Onset    Hypertension Mother     Diabetes Mother     Diabetes Father     Diabetes Sister     Diabetes Brother          Social History     Socioeconomic History    Marital status:    Tobacco Use    Smoking status: Never     Passive exposure: Never    Smokeless tobacco: Never   Vaping Use    Vaping status: Never Used   Substance and Sexual Activity    Alcohol use: Yes     Comment: very rarely    Drug use: Never    Sexual activity: Yes     Partners: Female         No Known Allergies      Pain Scale: 4/10 neck        ROS:  Review of Systems   Musculoskeletal:  Negative for gait problem.   Neurological:  Positive for dizziness, tremors, weakness, light-headedness, numbness and headaches. Negative for seizures, syncope, facial asymmetry and speech difficulty.   Psychiatric/Behavioral: Negative.         I have reviewed and agree with the above ROS completed by medical assistant.    Physical Exam:  Vitals:    06/23/25 1121   BP: 116/82   Pulse: 87   SpO2: 98%   Weight: 132 kg (292 lb)       Orthostatic BP:       Physical Exam  General: M obese White male no acute distress  HEENT: Normocephalic no evidence of trauma  Neck: Supple.    Heart: Regular rate and rhythm  Extremities: Radial pulses strong and simultaneous.  Adson sign questionable reduced pulse right wrist but left normal.        Neurological Exam:   Mental Status: Awake, alert, oriented to person, place and time.  Conversant without evidence of an affective disorder, thought disorder, delusions or hallucinations.  Attention span and concentration are normal.  HCF: No aphasia, apraxia or dysarthria.   Recent and remote memory intact.  Knowledge of recent events intact.  CN: I:   II: Visual fields full without left inattention   III, IV, VI: Eye movements intact without nystagmus or ptosis.  Pupils equal round and reactive to light.   V,VII: Light touch and pinprick intact all 3 divisions of V.  Facial muscles symmetrical.   VIII: Hearing intact to finger rub   IX,X: Soft palate elevates symmetrically   XI: Sternomastoid and trapezius are strong.   XII: Tongue midline without atrophy or fasciculations    Motor: Normal tone and bulk in the upper and lower extremities.    Power testing: Good strength in all muscles tested in the arms and legs    Reflexes: Upper extremities: Trace diffusely        Lower extremities: Trace diffusely        Toe signs:        Clonus:     Sensory: Light touch:        Pinprick:        Vibration:        Position:        Temperature:    Cerebellar: Finger-to-nose: Normal.  Very minimal postural tremor           Rapid movement: Normal           Heel-to-shin:    Gait and Station: Comes to stand without difficulty.  Normal casual toe heel and tandem walk.  No Romberg no drift.      Results:    Lab Results   Component Value Date    GLUCOSE 102 (H) 07/30/2024    BUN 13 07/30/2024    CREATININE 0.84 07/30/2024    BCR 15.5 07/30/2024    CO2 26.4 07/30/2024    CALCIUM 9.2 07/30/2024    ALBUMIN 4.1 09/30/2024    AST 37 01/29/2024    ALT 59 (H) 01/29/2024     Lab Results   Component Value Date    WBC 5.86 07/30/2024    HGB 14.3 07/30/2024    HCT 42.7 07/30/2024    MCV 84.7 07/30/2024     07/30/2024     Lab Results   Component Value Date    RPR Non Reactive 09/30/2024     Lab Results   Component Value Date    TSH 0.914 09/30/2024     Lab Results   Component Value Date    OZJQODTK97 647 09/30/2024     Lab Results   Component Value Date    FOLATE 18.1 09/30/2024     Lab Results   Component Value Date    HGBA1C 5.9 (H) 01/29/2024     Lab Results   Component Value Date    ARSENIC 3 09/30/2024      Lab Results   Component Value Date    MERCURY 1.6 09/30/2024     Lab Results   Component Value Date    COPPER 92 09/30/2024     Lab Results   Component Value Date    SEDRATE 7 09/30/2024         Assessment:    1.  Small fiber neuropathy-patient reports symptoms better controlled on gabapentin and amitriptyline.  He does report some drowsiness but states it is getting better.  He does not always take gabapentin 3 times a day sometimes just twice a day.  He was unable to tolerate Cymbalta due to side effects.  2.  Bilateral ulnar neuropathies at the elbow  3.  Tension headaches-no change.  Patient states worse when he is stressed with work.  4.  Dizziness-CTA head and neck unremarkable and did not show any vertebrobasilar insufficiency.  Suspect some autonomic involvement with small fiber neuropathy.  5.  Thoracic outlet syndrome-[facility] suspected BRUCE and recommended PVR vascular study.  6.  Chronic neck and back pain-denies any radicular pain.  No change.         Assessment and Plan   Diagnoses and all orders for this visit:    1. Idiopathic small fiber sensory neuropathy (Primary)    2. Thoracic outlet syndrome  -     Ambulatory Referral to Vascular Surgery    3. Dizziness    4. Tension headache    5. Ulnar neuropathy at elbow of left upper extremity    6. Ulnar neuropathy at elbow of right upper extremity    7. Chronic neck and back pain        Ideal targets for risk factor control would be Blood pressure < 130/80, B12 > 500, TSH in normal range and LDL < 70; HbA1c < 6.5 and smoking cessation if applicable. Call 911 for stroke symptoms.  Recommend 150 minutes of physical activity weekly.  Limit alcohol intake.  Continue gabapentin 600 mg 3 times a day.  Julio reviewed.  Side effects reviewed.  Continue amitriptyline 10 mg nightly  Referral to vascular surgery for PVR testing  Patient okay to return to work without any restrictions  Follow-up in 4 to 6 months or sooner if needed      Time: Spent 50  minutes in total encounter time. This included time for chart review, obtaining history, performing pertinent physical examination, updating medical information, ordering tests/referrals, discussion of diagnosis, medical management, counseling, and encounter documentation.        DEVONTE Sheridan          Much of this encounter note was dictated utilizing Dragon dictation which is an electronic transcription/translation of spoken language to printed text. The electronic translation of spoken language may permit erroneous, or at times, nonsensical words or phrases to be inadvertently transcribed; Although I have reviewed the note for such errors, some may still exist.    Portions of this assessment have been copied from previous documentation which has been thoroughly reviewed and updated as appropriate.

## 2025-06-23 ENCOUNTER — OFFICE VISIT (OUTPATIENT)
Dept: NEUROLOGY | Facility: CLINIC | Age: 40
End: 2025-06-23
Payer: COMMERCIAL

## 2025-06-23 VITALS
DIASTOLIC BLOOD PRESSURE: 82 MMHG | HEART RATE: 87 BPM | OXYGEN SATURATION: 98 % | BODY MASS INDEX: 44.41 KG/M2 | SYSTOLIC BLOOD PRESSURE: 116 MMHG | WEIGHT: 292 LBS

## 2025-06-23 DIAGNOSIS — G60.8 IDIOPATHIC SMALL FIBER SENSORY NEUROPATHY: Primary | ICD-10-CM

## 2025-06-23 DIAGNOSIS — M54.2 CHRONIC NECK AND BACK PAIN: ICD-10-CM

## 2025-06-23 DIAGNOSIS — G44.209 TENSION HEADACHE: ICD-10-CM

## 2025-06-23 DIAGNOSIS — G54.0 THORACIC OUTLET SYNDROME: ICD-10-CM

## 2025-06-23 DIAGNOSIS — M54.9 CHRONIC NECK AND BACK PAIN: ICD-10-CM

## 2025-06-23 DIAGNOSIS — G56.22 ULNAR NEUROPATHY AT ELBOW OF LEFT UPPER EXTREMITY: ICD-10-CM

## 2025-06-23 DIAGNOSIS — G56.21 ULNAR NEUROPATHY AT ELBOW OF RIGHT UPPER EXTREMITY: ICD-10-CM

## 2025-06-23 DIAGNOSIS — R42 DIZZINESS: ICD-10-CM

## 2025-06-23 DIAGNOSIS — G89.29 CHRONIC NECK AND BACK PAIN: ICD-10-CM

## 2025-06-23 PROCEDURE — 99215 OFFICE O/P EST HI 40 MIN: CPT

## 2025-06-23 NOTE — TELEPHONE ENCOUNTER
Called CC to check status of office note  Provider has not signed note yet which is why we can not see   I was told that provider usually takes about 2 weeks to complete notes

## 2025-06-23 NOTE — LETTER
June 23, 2025     Patient: Colin Basilio   YOB: 1985   Date of Visit: 6/23/2025       To Whom It May Concern:    It is my medical opinion that Colin Basilio may return to full duty immediately with no restrictions.           Sincerely,        DEVONTE Sheridan    CC: No Recipients

## 2025-07-08 DIAGNOSIS — K21.9 GASTROESOPHAGEAL REFLUX DISEASE, UNSPECIFIED WHETHER ESOPHAGITIS PRESENT: ICD-10-CM

## 2025-07-08 RX ORDER — LANSOPRAZOLE 30 MG/1
30 CAPSULE, DELAYED RELEASE ORAL DAILY
Qty: 90 CAPSULE | Refills: 0 | Status: SHIPPED | OUTPATIENT
Start: 2025-07-08

## 2025-07-21 DIAGNOSIS — G60.8 IDIOPATHIC SMALL FIBER SENSORY NEUROPATHY: Primary | ICD-10-CM

## 2025-07-25 DIAGNOSIS — I10 BENIGN ESSENTIAL HYPERTENSION: ICD-10-CM

## 2025-07-25 RX ORDER — METOPROLOL SUCCINATE 50 MG/1
75 TABLET, EXTENDED RELEASE ORAL DAILY
Qty: 45 TABLET | Refills: 0 | Status: SHIPPED | OUTPATIENT
Start: 2025-07-25

## 2025-07-25 RX ORDER — AMLODIPINE BESYLATE 10 MG/1
10 TABLET ORAL DAILY
Qty: 30 TABLET | Refills: 0 | Status: SHIPPED | OUTPATIENT
Start: 2025-07-25

## 2025-08-27 ENCOUNTER — OFFICE VISIT (OUTPATIENT)
Dept: FAMILY MEDICINE CLINIC | Facility: CLINIC | Age: 40
End: 2025-08-27
Payer: COMMERCIAL

## 2025-08-27 VITALS
OXYGEN SATURATION: 96 % | SYSTOLIC BLOOD PRESSURE: 120 MMHG | BODY MASS INDEX: 45.16 KG/M2 | HEIGHT: 68 IN | WEIGHT: 298 LBS | HEART RATE: 75 BPM | DIASTOLIC BLOOD PRESSURE: 82 MMHG

## 2025-08-27 DIAGNOSIS — G44.209 TENSION HEADACHE: ICD-10-CM

## 2025-08-27 DIAGNOSIS — Z13.220 LIPID SCREENING: ICD-10-CM

## 2025-08-27 DIAGNOSIS — K21.9 GASTROESOPHAGEAL REFLUX DISEASE, UNSPECIFIED WHETHER ESOPHAGITIS PRESENT: ICD-10-CM

## 2025-08-27 DIAGNOSIS — G60.8 IDIOPATHIC SMALL FIBER SENSORY NEUROPATHY: ICD-10-CM

## 2025-08-27 DIAGNOSIS — E78.5 HYPERLIPIDEMIA, UNSPECIFIED HYPERLIPIDEMIA TYPE: ICD-10-CM

## 2025-08-27 DIAGNOSIS — L74.519 PRIMARY FOCAL HYPERHIDROSIS: ICD-10-CM

## 2025-08-27 DIAGNOSIS — I10 BENIGN ESSENTIAL HYPERTENSION: ICD-10-CM

## 2025-08-27 DIAGNOSIS — Z00.00 ANNUAL PHYSICAL EXAM: Primary | ICD-10-CM

## 2025-08-27 LAB
CHOLEST SERPL-MCNC: 119 MG/DL (ref 0–200)
HDLC SERPL-MCNC: 37 MG/DL (ref 40–60)
LDLC SERPL CALC-MCNC: 64 MG/DL (ref 0–100)
TRIGL SERPL-MCNC: 95 MG/DL (ref 0–150)
VLDLC SERPL CALC-MCNC: 18 MG/DL (ref 5–40)

## 2025-08-27 RX ORDER — METOPROLOL SUCCINATE 50 MG/1
75 TABLET, EXTENDED RELEASE ORAL DAILY
Qty: 45 TABLET | Refills: 0 | Status: SHIPPED | OUTPATIENT
Start: 2025-08-27

## 2025-08-27 RX ORDER — GABAPENTIN 300 MG/1
600 CAPSULE ORAL 3 TIMES DAILY
Qty: 180 CAPSULE | Refills: 5 | Status: SHIPPED | OUTPATIENT
Start: 2025-08-27

## 2025-08-27 RX ORDER — ATORVASTATIN CALCIUM 40 MG/1
40 TABLET, FILM COATED ORAL DAILY
Qty: 90 TABLET | Refills: 0 | Status: SHIPPED | OUTPATIENT
Start: 2025-08-27

## 2025-08-27 RX ORDER — AMLODIPINE BESYLATE 10 MG/1
10 TABLET ORAL DAILY
Qty: 30 TABLET | Refills: 0 | Status: SHIPPED | OUTPATIENT
Start: 2025-08-27

## 2025-08-27 RX ORDER — LANSOPRAZOLE 30 MG/1
30 CAPSULE, DELAYED RELEASE ORAL DAILY
Qty: 90 CAPSULE | Refills: 0 | Status: SHIPPED | OUTPATIENT
Start: 2025-08-27

## 2025-08-27 RX ORDER — GLYCOPYRROLATE 2 MG/1
2 TABLET ORAL 2 TIMES DAILY
Qty: 90 TABLET | Refills: 0 | Status: SHIPPED | OUTPATIENT
Start: 2025-08-27

## (undated) DEVICE — GLV SURG TRIUMPH PF LTX 7.5 STRL

## (undated) DEVICE — NDL EPID TUOHY W/WINGS 20G 4.5IN

## (undated) DEVICE — EPIDURAL TRAY: Brand: MEDLINE INDUSTRIES, INC.

## (undated) DEVICE — Device: Brand: PORTEX